# Patient Record
Sex: MALE | URBAN - METROPOLITAN AREA
[De-identification: names, ages, dates, MRNs, and addresses within clinical notes are randomized per-mention and may not be internally consistent; named-entity substitution may affect disease eponyms.]

---

## 2017-08-02 ENCOUNTER — INPATIENT (INPATIENT)
Facility: HOSPITAL | Age: 67
LOS: 4 days | Discharge: HOME | End: 2017-08-07
Attending: SURGERY | Admitting: SURGERY
Payer: SELF-PAY

## 2017-08-02 VITALS
SYSTOLIC BLOOD PRESSURE: 209 MMHG | TEMPERATURE: 99 F | HEIGHT: 67 IN | RESPIRATION RATE: 20 BRPM | DIASTOLIC BLOOD PRESSURE: 119 MMHG | WEIGHT: 190.04 LBS | HEART RATE: 87 BPM | OXYGEN SATURATION: 95 %

## 2017-08-02 DIAGNOSIS — I10 ESSENTIAL (PRIMARY) HYPERTENSION: ICD-10-CM

## 2017-08-02 DIAGNOSIS — K35.80 UNSPECIFIED ACUTE APPENDICITIS: ICD-10-CM

## 2017-08-02 DIAGNOSIS — Z29.9 ENCOUNTER FOR PROPHYLACTIC MEASURES, UNSPECIFIED: ICD-10-CM

## 2017-08-02 LAB
ALBUMIN SERPL ELPH-MCNC: 4.1 G/DL — SIGNIFICANT CHANGE UP (ref 3.3–5)
ALP SERPL-CCNC: 82 U/L — SIGNIFICANT CHANGE UP (ref 40–120)
ALT FLD-CCNC: 22 U/L — SIGNIFICANT CHANGE UP (ref 12–78)
ANION GAP SERPL CALC-SCNC: 9 MMOL/L — SIGNIFICANT CHANGE UP (ref 5–17)
APPEARANCE UR: CLEAR — SIGNIFICANT CHANGE UP
APTT BLD: 30.8 SEC — SIGNIFICANT CHANGE UP (ref 27.5–37.4)
AST SERPL-CCNC: 22 U/L — SIGNIFICANT CHANGE UP (ref 15–37)
BASOPHILS # BLD AUTO: 0.1 K/UL — SIGNIFICANT CHANGE UP (ref 0–0.2)
BASOPHILS NFR BLD AUTO: 0.9 % — SIGNIFICANT CHANGE UP (ref 0–2)
BILIRUB SERPL-MCNC: 1.5 MG/DL — HIGH (ref 0.2–1.2)
BILIRUB UR-MCNC: NEGATIVE — SIGNIFICANT CHANGE UP
BUN SERPL-MCNC: 11 MG/DL — SIGNIFICANT CHANGE UP (ref 7–23)
CALCIUM SERPL-MCNC: 8.9 MG/DL — SIGNIFICANT CHANGE UP (ref 8.5–10.1)
CHLORIDE SERPL-SCNC: 105 MMOL/L — SIGNIFICANT CHANGE UP (ref 96–108)
CO2 SERPL-SCNC: 27 MMOL/L — SIGNIFICANT CHANGE UP (ref 22–31)
COLOR SPEC: YELLOW — SIGNIFICANT CHANGE UP
CREAT SERPL-MCNC: 1.18 MG/DL — SIGNIFICANT CHANGE UP (ref 0.5–1.3)
DIFF PNL FLD: NEGATIVE — SIGNIFICANT CHANGE UP
EOSINOPHIL # BLD AUTO: 0 K/UL — SIGNIFICANT CHANGE UP (ref 0–0.5)
EOSINOPHIL NFR BLD AUTO: 0.1 % — SIGNIFICANT CHANGE UP (ref 0–6)
GLUCOSE SERPL-MCNC: 117 MG/DL — HIGH (ref 70–99)
GLUCOSE UR QL: NEGATIVE MG/DL — SIGNIFICANT CHANGE UP
HCT VFR BLD CALC: 51.2 % — HIGH (ref 39–50)
HGB BLD-MCNC: 16.2 G/DL — SIGNIFICANT CHANGE UP (ref 13–17)
INR BLD: 1.1 RATIO — SIGNIFICANT CHANGE UP (ref 0.88–1.16)
KETONES UR-MCNC: NEGATIVE — SIGNIFICANT CHANGE UP
LACTATE SERPL-SCNC: 1.2 MMOL/L — SIGNIFICANT CHANGE UP (ref 0.7–2)
LEUKOCYTE ESTERASE UR-ACNC: NEGATIVE — SIGNIFICANT CHANGE UP
LIDOCAIN IGE QN: 141 U/L — SIGNIFICANT CHANGE UP (ref 73–393)
LYMPHOCYTES # BLD AUTO: 1.1 K/UL — SIGNIFICANT CHANGE UP (ref 1–3.3)
LYMPHOCYTES # BLD AUTO: 10.9 % — LOW (ref 13–44)
MCHC RBC-ENTMCNC: 29.6 PG — SIGNIFICANT CHANGE UP (ref 27–34)
MCHC RBC-ENTMCNC: 31.6 GM/DL — LOW (ref 32–36)
MCV RBC AUTO: 93.8 FL — SIGNIFICANT CHANGE UP (ref 80–100)
MONOCYTES # BLD AUTO: 0.5 K/UL — SIGNIFICANT CHANGE UP (ref 0–0.9)
MONOCYTES NFR BLD AUTO: 4.5 % — SIGNIFICANT CHANGE UP (ref 2–14)
NEUTROPHILS # BLD AUTO: 8.5 K/UL — HIGH (ref 1.8–7.4)
NEUTROPHILS NFR BLD AUTO: 83.5 % — HIGH (ref 43–77)
NITRITE UR-MCNC: NEGATIVE — SIGNIFICANT CHANGE UP
PH UR: 6.5 — SIGNIFICANT CHANGE UP (ref 5–8)
PLATELET # BLD AUTO: 128 K/UL — LOW (ref 150–400)
POTASSIUM SERPL-MCNC: 3.8 MMOL/L — SIGNIFICANT CHANGE UP (ref 3.5–5.3)
POTASSIUM SERPL-SCNC: 3.8 MMOL/L — SIGNIFICANT CHANGE UP (ref 3.5–5.3)
PROT SERPL-MCNC: 7.7 GM/DL — SIGNIFICANT CHANGE UP (ref 6–8.3)
PROT UR-MCNC: NEGATIVE MG/DL — SIGNIFICANT CHANGE UP
PROTHROM AB SERPL-ACNC: 12 SEC — SIGNIFICANT CHANGE UP (ref 9.8–12.7)
RBC # BLD: 5.46 M/UL — SIGNIFICANT CHANGE UP (ref 4.2–5.8)
RBC # FLD: 11.6 % — SIGNIFICANT CHANGE UP (ref 11–15)
SODIUM SERPL-SCNC: 141 MMOL/L — SIGNIFICANT CHANGE UP (ref 135–145)
SP GR SPEC: 1.01 — SIGNIFICANT CHANGE UP (ref 1.01–1.02)
UROBILINOGEN FLD QL: 1 MG/DL
WBC # BLD: 10.2 K/UL — SIGNIFICANT CHANGE UP (ref 3.8–10.5)
WBC # FLD AUTO: 10.2 K/UL — SIGNIFICANT CHANGE UP (ref 3.8–10.5)

## 2017-08-02 PROCEDURE — 71010: CPT | Mod: 26

## 2017-08-02 PROCEDURE — 99284 EMERGENCY DEPT VISIT MOD MDM: CPT

## 2017-08-02 PROCEDURE — 74177 CT ABD & PELVIS W/CONTRAST: CPT | Mod: 26

## 2017-08-02 RX ORDER — SODIUM CHLORIDE 9 MG/ML
3 INJECTION INTRAMUSCULAR; INTRAVENOUS; SUBCUTANEOUS ONCE
Qty: 0 | Refills: 0 | Status: COMPLETED | OUTPATIENT
Start: 2017-08-02 | End: 2017-08-02

## 2017-08-02 RX ORDER — HEPARIN SODIUM 5000 [USP'U]/ML
5000 INJECTION INTRAVENOUS; SUBCUTANEOUS EVERY 12 HOURS
Qty: 0 | Refills: 0 | Status: DISCONTINUED | OUTPATIENT
Start: 2017-08-02 | End: 2017-08-03

## 2017-08-02 RX ORDER — MORPHINE SULFATE 50 MG/1
2 CAPSULE, EXTENDED RELEASE ORAL EVERY 4 HOURS
Qty: 0 | Refills: 0 | Status: DISCONTINUED | OUTPATIENT
Start: 2017-08-02 | End: 2017-08-03

## 2017-08-02 RX ORDER — CEFOTETAN DISODIUM 1 G
2 VIAL (EA) INJECTION ONCE
Qty: 0 | Refills: 0 | Status: COMPLETED | OUTPATIENT
Start: 2017-08-02 | End: 2017-08-02

## 2017-08-02 RX ORDER — ONDANSETRON 8 MG/1
4 TABLET, FILM COATED ORAL ONCE
Qty: 0 | Refills: 0 | Status: DISCONTINUED | OUTPATIENT
Start: 2017-08-02 | End: 2017-08-03

## 2017-08-02 RX ORDER — ONDANSETRON 8 MG/1
4 TABLET, FILM COATED ORAL EVERY 6 HOURS
Qty: 0 | Refills: 0 | Status: DISCONTINUED | OUTPATIENT
Start: 2017-08-02 | End: 2017-08-03

## 2017-08-02 RX ORDER — IOHEXOL 300 MG/ML
30 INJECTION, SOLUTION INTRAVENOUS ONCE
Qty: 0 | Refills: 0 | Status: COMPLETED | OUTPATIENT
Start: 2017-08-02 | End: 2017-08-02

## 2017-08-02 RX ORDER — CEFOTETAN DISODIUM 1 G
2 VIAL (EA) INJECTION EVERY 12 HOURS
Qty: 0 | Refills: 0 | Status: DISCONTINUED | OUTPATIENT
Start: 2017-08-03 | End: 2017-08-03

## 2017-08-02 RX ORDER — MORPHINE SULFATE 50 MG/1
4 CAPSULE, EXTENDED RELEASE ORAL ONCE
Qty: 0 | Refills: 0 | Status: DISCONTINUED | OUTPATIENT
Start: 2017-08-02 | End: 2017-08-02

## 2017-08-02 RX ORDER — SODIUM CHLORIDE 9 MG/ML
1000 INJECTION, SOLUTION INTRAVENOUS
Qty: 0 | Refills: 0 | Status: DISCONTINUED | OUTPATIENT
Start: 2017-08-02 | End: 2017-08-03

## 2017-08-02 RX ORDER — ACETAMINOPHEN 500 MG
650 TABLET ORAL EVERY 6 HOURS
Qty: 0 | Refills: 0 | Status: DISCONTINUED | OUTPATIENT
Start: 2017-08-02 | End: 2017-08-03

## 2017-08-02 RX ORDER — METRONIDAZOLE 500 MG
500 TABLET ORAL ONCE
Qty: 0 | Refills: 0 | Status: COMPLETED | OUTPATIENT
Start: 2017-08-02 | End: 2017-08-02

## 2017-08-02 RX ORDER — METOPROLOL TARTRATE 50 MG
5 TABLET ORAL EVERY 6 HOURS
Qty: 0 | Refills: 0 | Status: DISCONTINUED | OUTPATIENT
Start: 2017-08-02 | End: 2017-08-03

## 2017-08-02 RX ORDER — MORPHINE SULFATE 50 MG/1
2 CAPSULE, EXTENDED RELEASE ORAL ONCE
Qty: 0 | Refills: 0 | Status: DISCONTINUED | OUTPATIENT
Start: 2017-08-02 | End: 2017-08-02

## 2017-08-02 RX ORDER — SODIUM CHLORIDE 9 MG/ML
1000 INJECTION INTRAMUSCULAR; INTRAVENOUS; SUBCUTANEOUS ONCE
Qty: 0 | Refills: 0 | Status: COMPLETED | OUTPATIENT
Start: 2017-08-02 | End: 2017-08-02

## 2017-08-02 RX ADMIN — SODIUM CHLORIDE 1000 MILLILITER(S): 9 INJECTION INTRAMUSCULAR; INTRAVENOUS; SUBCUTANEOUS at 19:46

## 2017-08-02 RX ADMIN — Medication 100 GRAM(S): at 23:07

## 2017-08-02 RX ADMIN — MORPHINE SULFATE 2 MILLIGRAM(S): 50 CAPSULE, EXTENDED RELEASE ORAL at 21:47

## 2017-08-02 RX ADMIN — MORPHINE SULFATE 2 MILLIGRAM(S): 50 CAPSULE, EXTENDED RELEASE ORAL at 22:17

## 2017-08-02 RX ADMIN — Medication 650 MILLIGRAM(S): at 23:49

## 2017-08-02 RX ADMIN — MORPHINE SULFATE 4 MILLIGRAM(S): 50 CAPSULE, EXTENDED RELEASE ORAL at 21:21

## 2017-08-02 RX ADMIN — Medication 100 MILLIGRAM(S): at 23:07

## 2017-08-02 RX ADMIN — Medication 5 MILLIGRAM(S): at 23:49

## 2017-08-02 RX ADMIN — IOHEXOL 30 MILLILITER(S): 300 INJECTION, SOLUTION INTRAVENOUS at 19:46

## 2017-08-02 RX ADMIN — SODIUM CHLORIDE 3 MILLILITER(S): 9 INJECTION INTRAMUSCULAR; INTRAVENOUS; SUBCUTANEOUS at 19:47

## 2017-08-02 RX ADMIN — MORPHINE SULFATE 4 MILLIGRAM(S): 50 CAPSULE, EXTENDED RELEASE ORAL at 19:45

## 2017-08-02 NOTE — H&P ADULT - NSHPSOCIALHISTORY_GEN_ALL_CORE
Patient admits to a history of smoking, but he quit approximately 5 years ago. Patient admits to drinking 2 beers per day but has not had anything to drink today.

## 2017-08-02 NOTE — H&P ADULT - NSHPLABSRESULTS_GEN_ALL_CORE
LABS:                        16.2   10.2  )-----------( 128      ( 02 Aug 2017 19:46 )             51.2     08    141  |  105  |  11  ----------------------------<  117<H>  3.8   |  27  |  1.18    Ca    8.9      02 Aug 2017 19:46    TPro  7.7  /  Alb  4.1  /  TBili  1.5<H>  /  DBili  x   /  AST  22  /  ALT  22  /  AlkPhos  82  08    PT/INR - ( 02 Aug 2017 19:46 )   PT: 12.0 sec;   INR: 1.10 ratio      PTT - ( 02 Aug 2017 19:46 )  PTT:30.8 sec  Urinalysis Basic - ( 02 Aug 2017 19:46 )    Color: Yellow / Appearance: Clear / S.010 / pH: x  Gluc: x / Ketone: Negative  / Bili: Negative / Urobili: 1 mg/dL   Blood: x / Protein: Negative mg/dL / Nitrite: Negative   Leuk Esterase: Negative / RBC: x / WBC x   Sq Epi: x / Non Sq Epi: x / Bacteria: x    RADIOLOGY & ADDITIONAL STUDIES:  from: CT Abdomen and Pelvis w/ Oral Cont and w/ IV Cont (17 @ 21:35): Uncomplicated acute appendicitis.

## 2017-08-02 NOTE — H&P ADULT - NSHPPHYSICALEXAM_GEN_ALL_CORE
Vital Signs Last 24 Hrs  T(C): 38.1 (02 Aug 2017 23:36), Max: 38.2 (02 Aug 2017 19:11)  T(F): 100.6 (02 Aug 2017 23:36), Max: 100.7 (02 Aug 2017 19:11)  HR: 80 (02 Aug 2017 23:36) (80 - 89)  BP: 169/95 (02 Aug 2017 23:36) (169/95 - 209/119)  RR: 22 (02 Aug 2017 23:36) (20 - 22)  SpO2: 97% (02 Aug 2017 23:36) (95% - 97%)    Physical Exam:    General:  Appears stated age, well-groomed, well-nourished, mild distress due to pain   Eyes : PERRL, EOMI  HENT:  WNL, no JVD  Chest:  CTA B/L  Cardiovascular:  Regular rate & rhythm, S1S2  Abdomen:  +BS, Soft, ++tender to palpation in RLQ, Mild distention. No gaurding, no rebound tenderness  Extremities:  No calf tenderness, no edema  Skin:  No rash  Neuro/Psych:  Alert, oriented to time, place and person

## 2017-08-02 NOTE — H&P ADULT - NSHPREVIEWOFSYSTEMS_GEN_ALL_CORE
I have reviewed 9 systems and the only positive findings are 101/10 RLQ abdominal pain which is radiating up his right side. +Nausea.

## 2017-08-02 NOTE — ED PROVIDER NOTE - PHYSICAL EXAMINATION
Gen: Alert, in moderate distress  Head: NC, AT, EOMI, normal lids/conjunctiva  ENT: patent oropharynx without erythema/exudate, uvula midline  Neck: +supple, no tenderness/meningismus/JVD, +Trachea midline  Pulm: Bilateral BS, normal resp effort, no wheeze/stridor/retractions  CV: RRR, no M/R/G, +dist pulses  Abd: soft, distended, +TTP greatest in RLQ, +guarding/+rebound, +rovsings, +mcburneys  Mskel: no edema/erythema/cyanosis  Skin: no rash  Neuro: AAOx3, no sensory/motor deficits, CN 2-12 intact Gen: Alert, in moderate distress  Head: NC, AT, EOMI, normal lids/conjunctiva  ENT: patent oropharynx without erythema/exudate, uvula midline  Neck: +supple, no tenderness/meningismus/JVD, +Trachea midline  Pulm: Bilateral BS, normal resp effort, no wheeze/stridor/retractions  CV: RRR, no M/R/G, +dist pulses  Abd: soft, distended, +TTP greatest in RLQ, +guarding/+rebound, +rovsings, +mcburneys  Mskel: no edema/erythema/cyanosis  Skin: no rash  Neuro: AAOx3, no gross sensory/motor deficits noted

## 2017-08-02 NOTE — ED PROVIDER NOTE - OBJECTIVE STATEMENT
Pertinent PMH/PSH/FHx/SHx and Review of Systems contained within:  H&P obtained with assistance of pt's family member as  since pt is mostly creole speaking. 67 yo male w/ pmh of nephrolithiasis in the past (last occurrence 8yrs ago), presents to the ED for abdominal pain X 1day. Reports constant pain, gradual onset, worsening throughout day, mostly RLQ, 10/10 intensity, exacerbated by movement, not relieved by anything in particular, associated w/ nausea. Denies fever, chills, cough, cp, sob, dysuria/hematuria, constipation/diarrhea, rash, trauma, past surgery or bleeding disorders. Denies med allergies. Has not taken any meds for pain. Last BM was approx 7pm today. Last PO food and drink was "sometime this afternoon". Pertinent PMH/PSH/FHx/SHx and Review of Systems contained within:  H&P obtained with assistance of pt's family member as  since pt is mostly creole speaking. 65 yo male w/ pmh of nephrolithiasis in the past (last occurrence 8yrs ago), presents to the ED for abdominal pain X 1day. Reports constant pain, gradual onset, worsening throughout day, mostly RLQ, 10/10 intensity, exacerbated by movement, not relieved by anything in particular, associated w/ nausea. Denies fever, chills, cough, cp, sob, dysuria/hematuria, constipation/diarrhea, dark or bloody stools, rash, trauma, past surgery or bleeding disorders. Denies med allergies. Has not taken any meds for pain. Last BM was approx 7am today. Last PO food and drink was "sometime this afternoon". Pertinent PMH/PSH/FHx/SHx and Review of Systems contained within:  H&P obtained with assistance of pt's family member as  since pt is mostly creole speaking. 65 yo male w/ pmh of nephrolithiasis in the past (last occurrence 8yrs ago), presents to the ED for abdominal pain X 1day. Reports constant pain, gradual onset, worsening throughout day, mostly RLQ, 10/10 intensity, exacerbated by movement, not relieved by anything in particular, associated w/ nausea. Denies fever, chills, cough, cp, sob, dysuria/hematuria, constipation/diarrhea, dark or bloody stools, rash, trauma, past surgery or bleeding disorders. Denies med allergies. Has not taken any meds for pain. Last BM was approx 7am today. Last PO food was approx 8am (eggs, bread and coffee).

## 2017-08-02 NOTE — H&P ADULT - ATTENDING COMMENTS
66 y./o. Irish male with brief history of abdominal pain associated with nausea and vomiting and RLQ tenderness. CT reveals uncomplicated appendicitis. For laparoscopic - possible open - appendicitis. R/B/A explained including possibility of open conversion and the circumstances under which that might occur. Son functioned as  throughout interview and consent process.

## 2017-08-02 NOTE — ED PROVIDER NOTE - MEDICAL DECISION MAKING DETAILS
67yo male w/ abd pain. H&P most consistent w/ acute appendicitis. Diff dx includes nephrolithiasis (though less likely - no hematuria, no back pain, no cva ttp) vs UTI vs gastritis.  IV fluids, pain mngmt, labs, CT abd/pelvis and will likely need gen surg consult and admission. Pt and his family understand and agree w/ plan.

## 2017-08-02 NOTE — H&P ADULT - HISTORY OF PRESENT ILLNESS
65 yo male w/ pmh of nephrolithiasis and HTN, not currently on medication presents to the ED for abdominal pain X 1day. Reports constant pain, gradual onset, worsening throughout day, mostly RLQ, 10/10 intensity, exacerbated by movement, not relieved by anything in particular, associated w/ nausea. Denies fever, chills, cough, cp, sob, dysuria/hematuria, constipation/diarrhea, dark or bloody stools, rash, trauma, past surgery or bleeding disorders. Denies med allergies. Last BM was approx 7am today. Last PO food was approx 8am (eggs, bread and coffee).    CT abdomen and pelvis shows acute uncomplicated appendicitis.

## 2017-08-02 NOTE — ED PROVIDER NOTE - PROGRESS NOTE DETAILS
Reassessed pt, his pain is now down to 6/10, repeat BP is 180s/90. Pt's son reports that pt has been diagnosed w/ htn in past and started "on a beta blocker" but pt's pmd in [facility] did not continue same. Instructed pt of need to f/u with PMD outpt to determine if he should be taking antihypertensive med.

## 2017-08-02 NOTE — H&P ADULT - PROBLEM SELECTOR PLAN 1
admit, OR in the morning for Laparoscopic Appendectomy, pain control, IV antibiotics, NPO, Zofran PRN

## 2017-08-02 NOTE — H&P ADULT - PROBLEM SELECTOR PLAN 2
Metoprolol 5mg IVP q6 started for improved control of BP, will switch to po after OR. Monitor BP and symptoms closely

## 2017-08-02 NOTE — ED ADULT TRIAGE NOTE - CHIEF COMPLAINT QUOTE
patient c/o of abdominal pain started this morning denied N/V denied diarrhea , denied chest pain ,c/o of mild headache

## 2017-08-03 ENCOUNTER — TRANSCRIPTION ENCOUNTER (OUTPATIENT)
Age: 67
End: 2017-08-03

## 2017-08-03 LAB
ANION GAP SERPL CALC-SCNC: 10 MMOL/L — SIGNIFICANT CHANGE UP (ref 5–17)
BUN SERPL-MCNC: 10 MG/DL — SIGNIFICANT CHANGE UP (ref 7–23)
CALCIUM SERPL-MCNC: 8.3 MG/DL — LOW (ref 8.5–10.1)
CHLORIDE SERPL-SCNC: 105 MMOL/L — SIGNIFICANT CHANGE UP (ref 96–108)
CO2 SERPL-SCNC: 25 MMOL/L — SIGNIFICANT CHANGE UP (ref 22–31)
CREAT SERPL-MCNC: 1.23 MG/DL — SIGNIFICANT CHANGE UP (ref 0.5–1.3)
CULTURE RESULTS: NO GROWTH — SIGNIFICANT CHANGE UP
GLUCOSE SERPL-MCNC: 130 MG/DL — HIGH (ref 70–99)
HCT VFR BLD CALC: 44.7 % — SIGNIFICANT CHANGE UP (ref 39–50)
HGB BLD-MCNC: 14.9 G/DL — SIGNIFICANT CHANGE UP (ref 13–17)
MAGNESIUM SERPL-MCNC: 2.1 MG/DL — SIGNIFICANT CHANGE UP (ref 1.6–2.6)
MCHC RBC-ENTMCNC: 30.6 PG — SIGNIFICANT CHANGE UP (ref 27–34)
MCHC RBC-ENTMCNC: 33.3 GM/DL — SIGNIFICANT CHANGE UP (ref 32–36)
MCV RBC AUTO: 91.8 FL — SIGNIFICANT CHANGE UP (ref 80–100)
PHOSPHATE SERPL-MCNC: 2.9 MG/DL — SIGNIFICANT CHANGE UP (ref 2.5–4.5)
PLATELET # BLD AUTO: 128 K/UL — LOW (ref 150–400)
POTASSIUM SERPL-MCNC: 3.7 MMOL/L — SIGNIFICANT CHANGE UP (ref 3.5–5.3)
POTASSIUM SERPL-SCNC: 3.7 MMOL/L — SIGNIFICANT CHANGE UP (ref 3.5–5.3)
RBC # BLD: 4.87 M/UL — SIGNIFICANT CHANGE UP (ref 4.2–5.8)
RBC # FLD: 11.5 % — SIGNIFICANT CHANGE UP (ref 11–15)
SODIUM SERPL-SCNC: 140 MMOL/L — SIGNIFICANT CHANGE UP (ref 135–145)
SPECIMEN SOURCE: SIGNIFICANT CHANGE UP
WBC # BLD: 11.2 K/UL — HIGH (ref 3.8–10.5)
WBC # FLD AUTO: 11.2 K/UL — HIGH (ref 3.8–10.5)

## 2017-08-03 PROCEDURE — 88304 TISSUE EXAM BY PATHOLOGIST: CPT | Mod: 26

## 2017-08-03 PROCEDURE — 44970 LAPAROSCOPY APPENDECTOMY: CPT | Mod: AS

## 2017-08-03 RX ORDER — CEFOTETAN DISODIUM 1 G
2 VIAL (EA) INJECTION EVERY 12 HOURS
Qty: 0 | Refills: 0 | Status: COMPLETED | OUTPATIENT
Start: 2017-08-03 | End: 2017-08-03

## 2017-08-03 RX ORDER — ONDANSETRON 8 MG/1
4 TABLET, FILM COATED ORAL EVERY 6 HOURS
Qty: 0 | Refills: 0 | Status: DISCONTINUED | OUTPATIENT
Start: 2017-08-03 | End: 2017-08-07

## 2017-08-03 RX ORDER — HYDRALAZINE HCL 50 MG
10 TABLET ORAL ONCE
Qty: 0 | Refills: 0 | Status: COMPLETED | OUTPATIENT
Start: 2017-08-03 | End: 2017-08-03

## 2017-08-03 RX ORDER — ACETAMINOPHEN 500 MG
650 TABLET ORAL EVERY 6 HOURS
Qty: 0 | Refills: 0 | Status: DISCONTINUED | OUTPATIENT
Start: 2017-08-03 | End: 2017-08-07

## 2017-08-03 RX ORDER — ACETAMINOPHEN 500 MG
1000 TABLET ORAL ONCE
Qty: 0 | Refills: 0 | Status: COMPLETED | OUTPATIENT
Start: 2017-08-03 | End: 2017-08-03

## 2017-08-03 RX ORDER — FENTANYL CITRATE 50 UG/ML
25 INJECTION INTRAVENOUS
Qty: 0 | Refills: 0 | Status: DISCONTINUED | OUTPATIENT
Start: 2017-08-03 | End: 2017-08-03

## 2017-08-03 RX ORDER — OXYCODONE AND ACETAMINOPHEN 5; 325 MG/1; MG/1
1 TABLET ORAL EVERY 4 HOURS
Qty: 0 | Refills: 0 | Status: DISCONTINUED | OUTPATIENT
Start: 2017-08-03 | End: 2017-08-07

## 2017-08-03 RX ORDER — OXYCODONE AND ACETAMINOPHEN 5; 325 MG/1; MG/1
2 TABLET ORAL EVERY 4 HOURS
Qty: 0 | Refills: 0 | Status: DISCONTINUED | OUTPATIENT
Start: 2017-08-03 | End: 2017-08-07

## 2017-08-03 RX ORDER — SODIUM CHLORIDE 9 MG/ML
1000 INJECTION, SOLUTION INTRAVENOUS
Qty: 0 | Refills: 0 | Status: DISCONTINUED | OUTPATIENT
Start: 2017-08-03 | End: 2017-08-03

## 2017-08-03 RX ORDER — MORPHINE SULFATE 50 MG/1
2 CAPSULE, EXTENDED RELEASE ORAL EVERY 6 HOURS
Qty: 0 | Refills: 0 | Status: DISCONTINUED | OUTPATIENT
Start: 2017-08-03 | End: 2017-08-07

## 2017-08-03 RX ORDER — HEPARIN SODIUM 5000 [USP'U]/ML
5000 INJECTION INTRAVENOUS; SUBCUTANEOUS EVERY 12 HOURS
Qty: 0 | Refills: 0 | Status: DISCONTINUED | OUTPATIENT
Start: 2017-08-03 | End: 2017-08-07

## 2017-08-03 RX ORDER — CHLORHEXIDINE GLUCONATE 213 G/1000ML
1 SOLUTION TOPICAL ONCE
Qty: 0 | Refills: 0 | Status: COMPLETED | OUTPATIENT
Start: 2017-08-03 | End: 2017-08-03

## 2017-08-03 RX ORDER — TAMSULOSIN HYDROCHLORIDE 0.4 MG/1
0.4 CAPSULE ORAL AT BEDTIME
Qty: 0 | Refills: 0 | Status: DISCONTINUED | OUTPATIENT
Start: 2017-08-03 | End: 2017-08-07

## 2017-08-03 RX ADMIN — MORPHINE SULFATE 2 MILLIGRAM(S): 50 CAPSULE, EXTENDED RELEASE ORAL at 19:52

## 2017-08-03 RX ADMIN — CHLORHEXIDINE GLUCONATE 1 APPLICATION(S): 213 SOLUTION TOPICAL at 07:46

## 2017-08-03 RX ADMIN — TAMSULOSIN HYDROCHLORIDE 0.4 MILLIGRAM(S): 0.4 CAPSULE ORAL at 22:11

## 2017-08-03 RX ADMIN — Medication 1000 MILLIGRAM(S): at 13:32

## 2017-08-03 RX ADMIN — Medication 100 GRAM(S): at 10:46

## 2017-08-03 RX ADMIN — SODIUM CHLORIDE 75 MILLILITER(S): 9 INJECTION, SOLUTION INTRAVENOUS at 13:31

## 2017-08-03 RX ADMIN — Medication 100 GRAM(S): at 23:52

## 2017-08-03 RX ADMIN — MORPHINE SULFATE 2 MILLIGRAM(S): 50 CAPSULE, EXTENDED RELEASE ORAL at 01:10

## 2017-08-03 RX ADMIN — Medication 10 MILLIGRAM(S): at 03:39

## 2017-08-03 RX ADMIN — Medication 400 MILLIGRAM(S): at 13:32

## 2017-08-03 RX ADMIN — HEPARIN SODIUM 5000 UNIT(S): 5000 INJECTION INTRAVENOUS; SUBCUTANEOUS at 18:03

## 2017-08-03 RX ADMIN — MORPHINE SULFATE 2 MILLIGRAM(S): 50 CAPSULE, EXTENDED RELEASE ORAL at 20:07

## 2017-08-03 RX ADMIN — SODIUM CHLORIDE 125 MILLILITER(S): 9 INJECTION, SOLUTION INTRAVENOUS at 10:31

## 2017-08-03 RX ADMIN — SODIUM CHLORIDE 125 MILLILITER(S): 9 INJECTION, SOLUTION INTRAVENOUS at 01:21

## 2017-08-03 RX ADMIN — Medication 5 MILLIGRAM(S): at 06:01

## 2017-08-03 RX ADMIN — HEPARIN SODIUM 5000 UNIT(S): 5000 INJECTION INTRAVENOUS; SUBCUTANEOUS at 06:01

## 2017-08-03 NOTE — PROGRESS NOTE ADULT - SUBJECTIVE AND OBJECTIVE BOX
INTERVAL HPI/OVERNIGHT EVENTS:  Called to bedside by RN for patient c/o suprapubic pain and inability to urinate. Per RN, bladder scan done following void which showed >480cc in bladder. Patient seen and examined at bedside. Family at bedside--son, daughter, and wife. Patient c/o inability to urinate well and increasing pain. Patient with history of BPH and HTN, but denies taking any medication at home. Denies ever needing a Hernandez placed in the past. Otherwise, patient is tolerating diet with no complaints of nausea/vomiting or abdominal pain.  Per son- Family is from [facility] and patient's PCP is in [facility].  Denies cp, dyspnea, dizziness.    Vital Signs Last 24 Hrs  T(C): 36.8 (03 Aug 2017 18:00), Max: 38.1 (02 Aug 2017 23:36)  T(F): 98.2 (03 Aug 2017 18:00), Max: 100.6 (02 Aug 2017 23:36)  HR: 72 (03 Aug 2017 18:00) (62 - 80)  BP: 172/89 (03 Aug 2017 18:00) (154/83 - 206/97)  BP(mean): --  RR: 16 (03 Aug 2017 18:00) (13 - 22)  SpO2: 95% (03 Aug 2017 18:00) (92% - 100%)    MEDICATIONS  (STANDING):  heparin  Injectable 5000 Unit(s) SubCutaneous every 12 hours  cefoTEtan  IVPB 2 Gram(s) IV Intermittent every 12 hours    MEDICATIONS  (PRN):  acetaminophen   Tablet 650 milliGRAM(s) Oral every 6 hours PRN For Temp greater than 38 C (100.4 F)  oxyCODONE    5 mG/acetaminophen 325 mG 1 Tablet(s) Oral every 4 hours PRN Mild Pain (1 - 3)  oxyCODONE    5 mG/acetaminophen 325 mG 2 Tablet(s) Oral every 4 hours PRN Moderate Pain (4 - 6)  ondansetron Injectable 4 milliGRAM(s) IV Push every 6 hours PRN Nausea  morphine  - Injectable 2 milliGRAM(s) IV Push every 6 hours PRN Severe Pain (7 - 10)      PHYSICAL EXAM:    GENERAL: NAD  HEAD:  Atraumatic, Normocephalic  EYES: EOMI, PERRLA, conjunctiva and sclera clear  CHEST/LUNG: Clear to ausculation, bilaterally   HEART: S1S2  ABDOMEN: Steri strips in place with minimal post-op blood stain noted, otherwise clean, dry. intact. nondistended, +BS, soft, non tender, no guarding  : Adequate Meatus. No scrotal edema. + Bladder distention, +Tenderness lower abdomen/suprapubic area. Urinal canister seen with small amt. clear yellow urine   EXTREMITIES:  calf soft, non tender     I&O's Detail    02 Aug 2017 07:01  -  03 Aug 2017 07:00  --------------------------------------------------------  IN:    dextrose 5% + sodium chloride 0.45%.: 1500 mL  Total IN: 1500 mL    OUT:    Voided: 200 mL  Total OUT: 200 mL    Total NET: 1300 mL      03 Aug 2017 07:01  -  03 Aug 2017 21:02  --------------------------------------------------------  IN:    IV PiggyBack: 100 mL    lactated ringers.: 150 mL  Total IN: 250 mL    OUT:    Voided: 2880 mL  Total OUT: 2880 mL    Total NET: -2630 mL      LABS:                        14.9   11.2  )-----------( 128      ( 03 Aug 2017 06:18 )             44.7     08-03    140  |  105  |  10  ----------------------------<  130<H>  3.7   |  25  |  1.23    Ca    8.3<L>      03 Aug 2017 06:18  Phos  2.9     08-03  Mg     2.1     08-03    TPro  7.7  /  Alb  4.1  /  TBili  1.5<H>  /  DBili  x   /  AST  22  /  ALT  22  /  AlkPhos  82  08-02    PT/INR - ( 02 Aug 2017 19:46 )   PT: 12.0 sec;   INR: 1.10 ratio         PTT - ( 02 Aug 2017 19:46 )  PTT:30.8 sec  Urinalysis Basic - ( 02 Aug 2017 19:46 )    Color: Yellow / Appearance: Clear / S.010 / pH: x  Gluc: x / Ketone: Negative  / Bili: Negative / Urobili: 1 mg/dL   Blood: x / Protein: Negative mg/dL / Nitrite: Negative   Leuk Esterase: Negative / RBC: x / WBC x   Sq Epi: x / Non Sq Epi: x / Bacteria: x      Impression: 66y Male with pmhx HTN, BPH a/w acute appendicitis s/p Lap Appy. With UR.    Plan:  -Patient in UR, with hx of BPH-- 14F coude catheter placed, Flomax.  -Advance diet as tolerated  -pain management PRN  -DVT prophylaxis  -OOB, Ambulating, encourage incentive spirometer  -continue local wound care  -Continue medical management/supportive care  -f/u AM labs  -Will discuss with attending.

## 2017-08-03 NOTE — PROGRESS NOTE ADULT - SUBJECTIVE AND OBJECTIVE BOX
Diagnosis:  Acute Appendicitis     Procedure:  Laparoscopic Appendectomy, possible open                             14.9   11.2  )-----------( 128      ( 03 Aug 2017 06:18 )             44.7         03 Aug 2017 06:18    140    |  105    |  10     ----------------------------<  130    3.7     |  25     |  1.23     Ca    8.3        03 Aug 2017 06:18  Phos  2.9       03 Aug 2017 06:18  Mg     2.1       03 Aug 2017 06:18    TPro  7.7    /  Alb  4.1    /  TBili  1.5    /  DBili  x      /  AST  22     /  ALT  22     /  AlkPhos  82     02 Aug 2017 19:46        PT/INR - ( 02 Aug 2017 19:46 )   PT: 12.0 sec;   INR: 1.10 ratio         PTT - ( 02 Aug 2017 19:46 )  PTT:30.8 sec    Urinalysis Basic - ( 02 Aug 2017 19:46 )    Color: Yellow / Appearance: Clear / S.010 / pH: x  Gluc: x / Ketone: Negative  / Bili: Negative / Urobili: 1 mg/dL   Blood: x / Protein: Negative mg/dL / Nitrite: Negative   Leuk Esterase: Negative / RBC: x / WBC x   Sq Epi: x / Non Sq Epi: x / Bacteria: x        LIVER FUNCTIONS - ( 02 Aug 2017 19:46 )  Alb: 4.1 g/dL / Pro: 7.7 gm/dL / ALK PHOS: 82 U/L / ALT: 22 U/L / AST: 22 U/L / GGT: x               npo   ivf   pain management   antibiotic   Consent signed and in chart -- Discuss risk and benefit

## 2017-08-04 LAB
ANION GAP SERPL CALC-SCNC: 10 MMOL/L — SIGNIFICANT CHANGE UP (ref 5–17)
BASOPHILS # BLD AUTO: 0.1 K/UL — SIGNIFICANT CHANGE UP (ref 0–0.2)
BASOPHILS NFR BLD AUTO: 1.1 % — SIGNIFICANT CHANGE UP (ref 0–2)
BUN SERPL-MCNC: 6 MG/DL — LOW (ref 7–23)
CALCIUM SERPL-MCNC: 8.5 MG/DL — SIGNIFICANT CHANGE UP (ref 8.5–10.1)
CHLORIDE SERPL-SCNC: 108 MMOL/L — SIGNIFICANT CHANGE UP (ref 96–108)
CO2 SERPL-SCNC: 24 MMOL/L — SIGNIFICANT CHANGE UP (ref 22–31)
CREAT SERPL-MCNC: 1.23 MG/DL — SIGNIFICANT CHANGE UP (ref 0.5–1.3)
EOSINOPHIL # BLD AUTO: 0 K/UL — SIGNIFICANT CHANGE UP (ref 0–0.5)
EOSINOPHIL NFR BLD AUTO: 0.7 % — SIGNIFICANT CHANGE UP (ref 0–6)
GLUCOSE SERPL-MCNC: 96 MG/DL — SIGNIFICANT CHANGE UP (ref 70–99)
HCT VFR BLD CALC: 47.6 % — SIGNIFICANT CHANGE UP (ref 39–50)
HGB BLD-MCNC: 15.2 G/DL — SIGNIFICANT CHANGE UP (ref 13–17)
LYMPHOCYTES # BLD AUTO: 1.3 K/UL — SIGNIFICANT CHANGE UP (ref 1–3.3)
LYMPHOCYTES # BLD AUTO: 21 % — SIGNIFICANT CHANGE UP (ref 13–44)
MAGNESIUM SERPL-MCNC: 2 MG/DL — SIGNIFICANT CHANGE UP (ref 1.6–2.6)
MCHC RBC-ENTMCNC: 30.1 PG — SIGNIFICANT CHANGE UP (ref 27–34)
MCHC RBC-ENTMCNC: 32 GM/DL — SIGNIFICANT CHANGE UP (ref 32–36)
MCV RBC AUTO: 94.1 FL — SIGNIFICANT CHANGE UP (ref 80–100)
MONOCYTES # BLD AUTO: 0.4 K/UL — SIGNIFICANT CHANGE UP (ref 0–0.9)
MONOCYTES NFR BLD AUTO: 6.8 % — SIGNIFICANT CHANGE UP (ref 2–14)
NEUTROPHILS # BLD AUTO: 4.5 K/UL — SIGNIFICANT CHANGE UP (ref 1.8–7.4)
NEUTROPHILS NFR BLD AUTO: 70.4 % — SIGNIFICANT CHANGE UP (ref 43–77)
PHOSPHATE SERPL-MCNC: 3 MG/DL — SIGNIFICANT CHANGE UP (ref 2.5–4.5)
PLATELET # BLD AUTO: 117 K/UL — LOW (ref 150–400)
POTASSIUM SERPL-MCNC: 3.6 MMOL/L — SIGNIFICANT CHANGE UP (ref 3.5–5.3)
POTASSIUM SERPL-SCNC: 3.6 MMOL/L — SIGNIFICANT CHANGE UP (ref 3.5–5.3)
RBC # BLD: 5.06 M/UL — SIGNIFICANT CHANGE UP (ref 4.2–5.8)
RBC # FLD: 11.8 % — SIGNIFICANT CHANGE UP (ref 11–15)
SODIUM SERPL-SCNC: 142 MMOL/L — SIGNIFICANT CHANGE UP (ref 135–145)
SURGICAL PATHOLOGY FINAL REPORT - CH: SIGNIFICANT CHANGE UP
WBC # BLD: 6.3 K/UL — SIGNIFICANT CHANGE UP (ref 3.8–10.5)
WBC # FLD AUTO: 6.3 K/UL — SIGNIFICANT CHANGE UP (ref 3.8–10.5)

## 2017-08-04 RX ORDER — TAMSULOSIN HYDROCHLORIDE 0.4 MG/1
0.4 CAPSULE ORAL ONCE
Qty: 0 | Refills: 0 | Status: COMPLETED | OUTPATIENT
Start: 2017-08-04 | End: 2017-08-04

## 2017-08-04 RX ORDER — METOPROLOL TARTRATE 50 MG
25 TABLET ORAL
Qty: 0 | Refills: 0 | Status: DISCONTINUED | OUTPATIENT
Start: 2017-08-04 | End: 2017-08-07

## 2017-08-04 RX ADMIN — Medication 25 MILLIGRAM(S): at 17:23

## 2017-08-04 RX ADMIN — HEPARIN SODIUM 5000 UNIT(S): 5000 INJECTION INTRAVENOUS; SUBCUTANEOUS at 05:33

## 2017-08-04 RX ADMIN — OXYCODONE AND ACETAMINOPHEN 2 TABLET(S): 5; 325 TABLET ORAL at 00:29

## 2017-08-04 RX ADMIN — TAMSULOSIN HYDROCHLORIDE 0.4 MILLIGRAM(S): 0.4 CAPSULE ORAL at 12:49

## 2017-08-04 RX ADMIN — OXYCODONE AND ACETAMINOPHEN 2 TABLET(S): 5; 325 TABLET ORAL at 01:29

## 2017-08-04 RX ADMIN — OXYCODONE AND ACETAMINOPHEN 2 TABLET(S): 5; 325 TABLET ORAL at 16:15

## 2017-08-04 RX ADMIN — TAMSULOSIN HYDROCHLORIDE 0.4 MILLIGRAM(S): 0.4 CAPSULE ORAL at 22:02

## 2017-08-04 RX ADMIN — HEPARIN SODIUM 5000 UNIT(S): 5000 INJECTION INTRAVENOUS; SUBCUTANEOUS at 17:23

## 2017-08-04 RX ADMIN — OXYCODONE AND ACETAMINOPHEN 2 TABLET(S): 5; 325 TABLET ORAL at 15:19

## 2017-08-04 NOTE — DISCHARGE NOTE ADULT - ADDITIONAL INSTRUCTIONS
Please notify physician sooner or return to the ER with worsening or recurrence of symptoms, if any chest pain, shortness of breath, palpitations, abdominal pain, nausea/vomiting, fever>101, unable to tolerate food/drink, inability to void or other concerns/problems.

## 2017-08-04 NOTE — DISCHARGE NOTE ADULT - NS AS ACTIVITY OBS
Do not make important decisions/Do not drive or operate machinery/Walking-Indoors allowed/Walking-Outdoors allowed/Stairs allowed/No Heavy lifting/straining/Showering allowed

## 2017-08-04 NOTE — DISCHARGE NOTE ADULT - MEDICATION SUMMARY - MEDICATIONS TO TAKE
I will START or STAY ON the medications listed below when I get home from the hospital:    acetaminophen-oxycodone 325 mg-5 mg oral tablet  -- 1 tab(s) by mouth every 6 hours, As Needed -for moderate pain MDD:4  -- Caution federal law prohibits the transfer of this drug to any person other  than the person for whom it was prescribed.  May cause drowsiness.  Alcohol may intensify this effect.  Use care when operating dangerous machinery.  This prescription cannot be refilled.  This product contains acetaminophen.  Do not use  with any other product containing acetaminophen to prevent possible liver damage.  Using more of this medication than prescribed may cause serious breathing problems.    -- Indication: For Pain    lisinopril 20 mg oral tablet  -- 1 tab(s) by mouth once a day  -- Do not take this drug if you are pregnant.  It is very important that you take or use this exactly as directed.  Do not skip doses or discontinue unless directed by your doctor.  Some non-prescription drugs may aggravate your condition.  Read all labels carefully.  If a warning appears, check with your doctor before taking.    -- Indication: For Hypertension    tamsulosin 0.4 mg oral capsule  -- 1 cap(s) by mouth once a day  -- It is very important that you take or use this exactly as directed.  Do not skip doses or discontinue unless directed by your doctor.  May cause drowsiness.  Alcohol may intensify this effect.  Use care when operating dangerous machinery.  Some non-prescription drugs may aggravate your condition.  Read all labels carefully.  If a warning appears, check with your doctor before taking.  Swallow whole.  Do not crush.  Take with food or milk.    -- Indication: For BPH (benign prostatic hyperplasia)    metoprolol 25 mg oral tablet  -- 1 tab(s) by mouth 2 times a day  -- It is very important that you take or use this exactly as directed.  Do not skip doses or discontinue unless directed by your doctor.  May cause drowsiness.  Alcohol may intensify this effect.  Use care when operating dangerous machinery.  Some non-prescription drugs may aggravate your condition.  Read all labels carefully.  If a warning appears, check with your doctor before taking.  Take with food or milk.  This drug may impair the ability to drive or operate machinery.  Use care until you become familiar with its effects.    -- Indication: For Hypertension    hydroCHLOROthiazide 25 mg oral tablet  -- 1 tab(s) by mouth once a day  -- Avoid prolonged or excessive exposure to direct and/or artificial sunlight while taking this medication.  It is very important that you take or use this exactly as directed.  Do not skip doses or discontinue unless directed by your doctor.  It may be advisable to drink a full glass orange juice or eat a banana daily while taking this medication.  Take with food or milk.    -- Indication: For Hypertension

## 2017-08-04 NOTE — DISCHARGE NOTE ADULT - HOSPITAL COURSE
65 yo male w/ pmh of nephrolithiasis, BPH and HTN, not currently on medications presents to the ED for abdominal pain X 1day. Diagnostic work up in the ED included a CT scan A/P which revealed an uncomplicated acute appendicitis. Patient was admitted on 8/2/17 for acute appendicitis and uncontrolled HTN. Patient was pre-opped for surgery, placed NPO, IV hydration, ABX, analgesia PRN, and BP was closely monitored and controlled with use of metoprolol.  Patient was taken to the OR the next morning 8/3/17 for laparoscopic appendectomy. Intraoperatively, appendix was removed without complications, EBL 10cc, and patient did well. Postoperatively, patient was seen in urinary retention with suprapubic pain and distended bladder with a bladder scan of >480cc of urine, a 14F coude catheter was placed which provided relief to patient. Patient was started on flomax. Otherwise, patient's post-op abdominal pain was well controlled, and patient was tolerating diet with no complaints of nasuea or vomiting. On the morning of 8/4/17-- 67 yo male w/ pmh of nephrolithiasis, BPH and HTN, not currently on medications presents to the ED for abdominal pain X 1day. Diagnostic work up in the ED included a CT scan A/P which revealed an uncomplicated acute appendicitis. Patient was admitted on 8/2/17 for acute appendicitis and uncontrolled HTN. Patient was pre-opped for surgery, placed NPO, IV hydration, ABX, analgesia PRN, and BP was closely monitored and controlled with use of metoprolol.  Patient was taken to the OR the next morning 8/3/17 for laparoscopic appendectomy. Intraoperatively, appendix was removed without complications, EBL 10cc, and patient did well. Postoperatively, patient was seen in urinary retention with suprapubic pain and distended bladder with a bladder scan of >480cc of urine, a 14F coude catheter was placed which provided relief to patient. Patient was started on flomax. Otherwise, patient's post-op abdominal pain was well controlled, and patient was tolerating diet with no complaints of nasuea or vomiting. Patient voided on next trial of void. With 3 HTN meds, BP improved.  Patient did well with the rest of the course.  Postop pain is minimal.  Tolerated advancing diet and bowel function returned.  Patient was dc with improved BP and cleared by medicine.

## 2017-08-04 NOTE — DISCHARGE NOTE ADULT - PATIENT PORTAL LINK FT
“You can access the [facility] Patient Portal, offered by [facility], by registering with the following website: [email or web address]”

## 2017-08-04 NOTE — DISCHARGE NOTE ADULT - CARE PLAN
Principal Discharge DX:	Acute appendicitis, unspecified acute appendicitis type  Goal:	S/p Laparoscopic Appendectomy, recover from surgery  Instructions for follow-up, activity and diet:	May use OTC motrin or tylenol as needed for mild-moderate pain, or prescribed percocet as needed for moderate-severe pain. May use stool softener while taking prescribed pain medication to prevent constipation. Do not drive while taking prescribed pain medication. Drink plenty of fluids, avoid overexertion, and get plenty of rest. Please call the office of Dr De Jesus to make an apt. for follow up in 10-14days.  Secondary Diagnosis:	Hypertension  Instructions for follow-up, activity and diet:	Follow up with PCP for further blood pressure management.  Secondary Diagnosis:	BPH (benign prostatic hyperplasia)  Instructions for follow-up, activity and diet:	Follow up with PCP, or Urologist in 1-2 weeks.

## 2017-08-04 NOTE — DISCHARGE NOTE ADULT - CARE PROVIDER_API CALL
Wili De Jesus (MD), Surgery  [address] [facility]  [address] [place] [id number]  Phone: [phone]  Fax: [phone]

## 2017-08-04 NOTE — DISCHARGE NOTE ADULT - PLAN OF CARE
S/p Laparoscopic Appendectomy, recover from surgery May use OTC motrin or tylenol as needed for mild-moderate pain, or prescribed percocet as needed for moderate-severe pain. May use stool softener while taking prescribed pain medication to prevent constipation. Do not drive while taking prescribed pain medication. Drink plenty of fluids, avoid overexertion, and get plenty of rest. Please call the office of Dr De Jesus to make an apt. for follow up in 10-14days. Follow up with PCP for further blood pressure management. Follow up with PCP, or Urologist in 1-2 weeks.

## 2017-08-04 NOTE — PROGRESS NOTE ADULT - SUBJECTIVE AND OBJECTIVE BOX
INTERVAL HPI/OVERNIGHT EVENTS:  Pt. seen and examined at bedside. Patient's son at bedside. Patient was in UR with suprapubic pain at around 10pm last night postoperatively, where a 14F coude was placed. No other acute overnight events since this event. Patient rested well overnight with no complaints of abdominal pain or suprapubic pain. Tolerating alonzo. Tolerating CLD, denies N/V. No BM or Flatus yet. Has not ambulated yet.   Denies CP, sob, dizziness, fever/chills.    Vital Signs Last 24 Hrs  T(C): 37.3 (04 Aug 2017 05:15), Max: 37.7 (04 Aug 2017 02:00)  T(F): 99.1 (04 Aug 2017 05:15), Max: 99.9 (04 Aug 2017 02:00)  HR: 77 (04 Aug 2017 05:15) (65 - 77)  BP: 151/84 (04 Aug 2017 05:15) (151/84 - 206/97)  BP(mean): --  RR: 17 (04 Aug 2017 05:15) (13 - 20)  SpO2: 93% (04 Aug 2017 05:15) (92% - 100%)    MEDICATIONS  (STANDING):  heparin  Injectable 5000 Unit(s) SubCutaneous every 12 hours  tamsulosin 0.4 milliGRAM(s) Oral at bedtime    MEDICATIONS  (PRN):  acetaminophen   Tablet 650 milliGRAM(s) Oral every 6 hours PRN For Temp greater than 38 C (100.4 F)  oxyCODONE    5 mG/acetaminophen 325 mG 1 Tablet(s) Oral every 4 hours PRN Mild Pain (1 - 3)  oxyCODONE    5 mG/acetaminophen 325 mG 2 Tablet(s) Oral every 4 hours PRN Moderate Pain (4 - 6)  ondansetron Injectable 4 milliGRAM(s) IV Push every 6 hours PRN Nausea  morphine  - Injectable 2 milliGRAM(s) IV Push every 6 hours PRN Severe Pain (7 - 10)      PHYSICAL EXAM:    GENERAL: NAD  HEAD:  Atraumatic, Normocephalic  EYES: EOMI, PERRLA, conjunctiva and sclera clear  CHEST/LUNG: Clear to ausculation, bilaterally   HEART: S1S2, RRR  ABDOMEN: mildly distended, Steri strips in place over port sites- C/D/I +BS, soft, non tender, no guarding  : Indwelling alonzo cath in place draining clear yellow urine (1450cc over 8hrs)  EXTREMITIES:  calf soft, non tender     I&O's Detail    02 Aug 2017 07:01  -  03 Aug 2017 07:00  --------------------------------------------------------  IN:    dextrose 5% + sodium chloride 0.45%.: 1500 mL  Total IN: 1500 mL    OUT:    Voided: 200 mL  Total OUT: 200 mL    Total NET: 1300 mL      03 Aug 2017 07:01  -  04 Aug 2017 06:44  --------------------------------------------------------  IN:    lactated ringers.: 150 mL    Oral Fluid: 240 mL    Solution: 150 mL  Total IN: 540 mL    OUT:    Indwelling Catheter - Urethral: 1450 mL    Voided: 2880 mL  Total OUT: 4330 mL    Total NET: -3790 mL      LABS:                        14.9   11.2  )-----------( 128      ( 03 Aug 2017 06:18 )             44.7     08-03    140  |  105  |  10  ----------------------------<  130<H>  3.7   |  25  |  1.23    Ca    8.3<L>      03 Aug 2017 06:18  Phos  2.9     08-  Mg     2.1     08-    TPro  7.7  /  Alb  4.1  /  TBili  1.5<H>  /  DBili  x   /  AST  22  /  ALT  22  /  AlkPhos  82  08-02    PT/INR - ( 02 Aug 2017 19:46 )   PT: 12.0 sec;   INR: 1.10 ratio         PTT - ( 02 Aug 2017 19:46 )  PTT:30.8 sec  Urinalysis Basic - ( 02 Aug 2017 19:46 )    Color: Yellow / Appearance: Clear / S.010 / pH: x  Gluc: x / Ketone: Negative  / Bili: Negative / Urobili: 1 mg/dL   Blood: x / Protein: Negative mg/dL / Nitrite: Negative   Leuk Esterase: Negative / RBC: x / WBC x   Sq Epi: x / Non Sq Epi: x / Bacteria: x    Impression: 66y Male with PMH HTN, BPH admitted with Acute Appendicitis s/p Lap Appy POD#1. In UR post-operatively, 14F coude was placed. Clinically stable.    Plan:  -Advance diet as tolerated  -D/C IVF  -D/C Alonzo this AM for TOV, continue flomax  -pain management PRN  -DVT prophylaxis: Heparin and IPCs  -OOB, Ambulating, encourage incentive spirometer  -continue local wound care  -Continue medical management/supportive care  -f/u AM labs  -will discus pt. with surgical attending

## 2017-08-05 DIAGNOSIS — N20.0 CALCULUS OF KIDNEY: ICD-10-CM

## 2017-08-05 DIAGNOSIS — N40.1 BENIGN PROSTATIC HYPERPLASIA WITH LOWER URINARY TRACT SYMPTOMS: ICD-10-CM

## 2017-08-05 DIAGNOSIS — R33.9 RETENTION OF URINE, UNSPECIFIED: ICD-10-CM

## 2017-08-05 DIAGNOSIS — I10 ESSENTIAL (PRIMARY) HYPERTENSION: ICD-10-CM

## 2017-08-05 LAB
ANION GAP SERPL CALC-SCNC: 10 MMOL/L — SIGNIFICANT CHANGE UP (ref 5–17)
BUN SERPL-MCNC: 8 MG/DL — SIGNIFICANT CHANGE UP (ref 7–23)
CALCIUM SERPL-MCNC: 8.5 MG/DL — SIGNIFICANT CHANGE UP (ref 8.5–10.1)
CHLORIDE SERPL-SCNC: 105 MMOL/L — SIGNIFICANT CHANGE UP (ref 96–108)
CO2 SERPL-SCNC: 25 MMOL/L — SIGNIFICANT CHANGE UP (ref 22–31)
CREAT SERPL-MCNC: 1.14 MG/DL — SIGNIFICANT CHANGE UP (ref 0.5–1.3)
GLUCOSE SERPL-MCNC: 112 MG/DL — HIGH (ref 70–99)
HCT VFR BLD CALC: 45.3 % — SIGNIFICANT CHANGE UP (ref 39–50)
HGB BLD-MCNC: 14.8 G/DL — SIGNIFICANT CHANGE UP (ref 13–17)
MAGNESIUM SERPL-MCNC: 2.2 MG/DL — SIGNIFICANT CHANGE UP (ref 1.6–2.6)
MCHC RBC-ENTMCNC: 29.9 PG — SIGNIFICANT CHANGE UP (ref 27–34)
MCHC RBC-ENTMCNC: 32.8 GM/DL — SIGNIFICANT CHANGE UP (ref 32–36)
MCV RBC AUTO: 91.2 FL — SIGNIFICANT CHANGE UP (ref 80–100)
PHOSPHATE SERPL-MCNC: 2.4 MG/DL — LOW (ref 2.5–4.5)
PLATELET # BLD AUTO: 135 K/UL — LOW (ref 150–400)
POTASSIUM SERPL-MCNC: 3.7 MMOL/L — SIGNIFICANT CHANGE UP (ref 3.5–5.3)
POTASSIUM SERPL-SCNC: 3.7 MMOL/L — SIGNIFICANT CHANGE UP (ref 3.5–5.3)
RBC # BLD: 4.97 M/UL — SIGNIFICANT CHANGE UP (ref 4.2–5.8)
RBC # FLD: 11.6 % — SIGNIFICANT CHANGE UP (ref 11–15)
SODIUM SERPL-SCNC: 140 MMOL/L — SIGNIFICANT CHANGE UP (ref 135–145)
WBC # BLD: 4.7 K/UL — SIGNIFICANT CHANGE UP (ref 3.8–10.5)
WBC # FLD AUTO: 4.7 K/UL — SIGNIFICANT CHANGE UP (ref 3.8–10.5)

## 2017-08-05 PROCEDURE — 99255 IP/OBS CONSLTJ NEW/EST HI 80: CPT

## 2017-08-05 RX ORDER — LISINOPRIL 2.5 MG/1
10 TABLET ORAL DAILY
Qty: 0 | Refills: 0 | Status: DISCONTINUED | OUTPATIENT
Start: 2017-08-05 | End: 2017-08-06

## 2017-08-05 RX ORDER — HYDRALAZINE HCL 50 MG
10 TABLET ORAL ONCE
Qty: 0 | Refills: 0 | Status: COMPLETED | OUTPATIENT
Start: 2017-08-05 | End: 2017-08-05

## 2017-08-05 RX ORDER — LISINOPRIL 2.5 MG/1
10 TABLET ORAL ONCE
Qty: 0 | Refills: 0 | Status: DISCONTINUED | OUTPATIENT
Start: 2017-08-05 | End: 2017-08-05

## 2017-08-05 RX ORDER — HYDRALAZINE HCL 50 MG
10 TABLET ORAL EVERY 6 HOURS
Qty: 0 | Refills: 0 | Status: DISCONTINUED | OUTPATIENT
Start: 2017-08-05 | End: 2017-08-07

## 2017-08-05 RX ORDER — HYDRALAZINE HCL 50 MG
10 TABLET ORAL
Qty: 0 | Refills: 0 | Status: DISCONTINUED | OUTPATIENT
Start: 2017-08-05 | End: 2017-08-06

## 2017-08-05 RX ADMIN — Medication 10 MILLIGRAM(S): at 02:09

## 2017-08-05 RX ADMIN — Medication 10 MILLIGRAM(S): at 18:30

## 2017-08-05 RX ADMIN — HEPARIN SODIUM 5000 UNIT(S): 5000 INJECTION INTRAVENOUS; SUBCUTANEOUS at 18:30

## 2017-08-05 RX ADMIN — HEPARIN SODIUM 5000 UNIT(S): 5000 INJECTION INTRAVENOUS; SUBCUTANEOUS at 06:28

## 2017-08-05 RX ADMIN — LISINOPRIL 10 MILLIGRAM(S): 2.5 TABLET ORAL at 03:54

## 2017-08-05 RX ADMIN — TAMSULOSIN HYDROCHLORIDE 0.4 MILLIGRAM(S): 0.4 CAPSULE ORAL at 21:35

## 2017-08-05 RX ADMIN — ONDANSETRON 4 MILLIGRAM(S): 8 TABLET, FILM COATED ORAL at 03:22

## 2017-08-05 RX ADMIN — Medication 25 MILLIGRAM(S): at 18:30

## 2017-08-05 RX ADMIN — Medication 25 MILLIGRAM(S): at 06:28

## 2017-08-05 NOTE — PROGRESS NOTE ADULT - SUBJECTIVE AND OBJECTIVE BOX
INTERVAL HPI/OVERNIGHT EVENTS:  Called by RN for patient c/o nausea and elevated BP. Patient's BP was 182/101 at 1:30am and was given a push of hydralazine. Repeat BP was 157/81 following hydralazine, however patient c/o nausea 30 min later and found with an elevated BP of 197/96. Zofran was given by RN.     Pt. seen and examined at bedside. C/o nausea that has improved after dose of zofran, denies vomiting. Abdominal pain well controlled with pain medication. Has been tolerating reg. diet. No BM. Ambulated during day yesterday. Tolerating alonzo, denies suprapubic pain.  Denies CP, dizziness, palpitations, HA, blurred vision or fever/chills.     Vital Signs Last 24 Hrs  T(C): 37 (04 Aug 2017 23:33), Max: 37.3 (04 Aug 2017 05:15)  T(F): 98.6 (04 Aug 2017 23:33), Max: 99.1 (04 Aug 2017 05:15)  HR: 72 (05 Aug 2017 02:30) (65 - 77)  BP: 157/81 (05 Aug 2017 02:30) (151/84 - 182/102)  BP(mean): --  RR: 16 (05 Aug 2017 02:30) (16 - 18)  SpO2: 94% (05 Aug 2017 02:30) (93% - 96%)    MEDICATIONS  (STANDING):  heparin  Injectable 5000 Unit(s) SubCutaneous every 12 hours  tamsulosin 0.4 milliGRAM(s) Oral at bedtime  metoprolol 25 milliGRAM(s) Oral two times a day  lisinopril 10 milliGRAM(s) Oral daily    MEDICATIONS  (PRN):  acetaminophen   Tablet 650 milliGRAM(s) Oral every 6 hours PRN For Temp greater than 38 C (100.4 F)  oxyCODONE    5 mG/acetaminophen 325 mG 1 Tablet(s) Oral every 4 hours PRN Mild Pain (1 - 3)  oxyCODONE    5 mG/acetaminophen 325 mG 2 Tablet(s) Oral every 4 hours PRN Moderate Pain (4 - 6)  ondansetron Injectable 4 milliGRAM(s) IV Push every 6 hours PRN Nausea  morphine  - Injectable 2 milliGRAM(s) IV Push every 6 hours PRN Severe Pain (7 - 10)      PHYSICAL EXAM:    GENERAL: NAD  HEAD:  Atraumatic, Normocephalic  EYES: EOMI, PERRLA, conjunctiva and sclera clear  CHEST/LUNG: Clear to ausculation, bilaterally   HEART: S1S2, RRR  ABDOMEN: Steri strips over port sites C/D/I; nondistended, +BS, soft, appropriate postop incisional tenderness, no guarding  : Alonzo in place draining clear yellow urine.  EXTREMITIES:  calf soft, non tender. No peripheral edema.    I&O's Detail    03 Aug 2017 07:01  -  04 Aug 2017 07:00  --------------------------------------------------------  IN:    lactated ringers.: 150 mL    Oral Fluid: 240 mL    Solution: 150 mL  Total IN: 540 mL    OUT:    Indwelling Catheter - Urethral: 1450 mL    Voided: 2880 mL  Total OUT: 4330 mL    Total NET: -3790 mL      04 Aug 2017 07:01  -  05 Aug 2017 03:37  --------------------------------------------------------  IN:  Total IN: 0 mL    OUT:    Indwelling Catheter - Urethral: 600 mL  Total OUT: 600 mL    Total NET: -600 mL      LABS:                        15.2   6.3   )-----------( 117      ( 04 Aug 2017 06:41 )             47.6     08-04    142  |  108  |  6<L>  ----------------------------<  96  3.6   |  24  |  1.23    Ca    8.5      04 Aug 2017 06:41  Phos  3.0     08-04  Mg     2.0     08-04      Impression: 66y Male with pmhx HTN, BPH a/w acute appendicitis s/p Lap Appy POD#2 complicated by UR, with alonzo in place. Also with uncontrolled HTN.    Plan:  -Patient in UR, with hx of BPH-- 14F coude catheter placed-->Alonzo to be removed at 7am for TOV today.  -Lisinopril 10mg PO daily started now, continue metoprolol 25mg PO BID  -Reg diet as tolerated  -pain management PRN, antiemetic PRN  -DVT prophylaxis  -OOB, Ambulating, encourage incentive spirometer  -continue local wound care  -Continue medical management/supportive care  -f/u AM labs  -Will discuss with surgical attending  -Case was discussed with House Physician- Dr. Key in regards to patient's BP management. Recommended to start the patient on lisinopril 10mg daily starting now, as well as continuing with metoprolol 25mg BID. Patient's BP has been on the higher side, with systolic BPs >150 since admission. Target BP goal between 140-150 systolic. INTERVAL HPI/OVERNIGHT EVENTS:  Called by RN for patient c/o nausea and elevated BP. Patient's BP was 182/101 at 1:30am and was given a push of hydralazine. Repeat BP was 157/81 following hydralazine, however patient c/o nausea 30 min later and found with an elevated BP of 197/96. Zofran was given by RN.     Pt. seen and examined at bedside. C/o nausea that has improved after dose of zofran, denies vomiting. Abdominal pain well controlled with pain medication. Has been tolerating reg. diet. No BM. Ambulated during day yesterday. Tolerating alonzo, denies suprapubic pain.  Denies CP, dizziness, palpitations, HA, blurred vision or fever/chills.     Vital Signs Last 24 Hrs  T(C): 37 (04 Aug 2017 23:33), Max: 37.3 (04 Aug 2017 05:15)  T(F): 98.6 (04 Aug 2017 23:33), Max: 99.1 (04 Aug 2017 05:15)  HR: 72 (05 Aug 2017 02:30) (65 - 77)  BP: 157/81 (05 Aug 2017 02:30) (151/84 - 182/102)  BP(mean): --  RR: 16 (05 Aug 2017 02:30) (16 - 18)  SpO2: 94% (05 Aug 2017 02:30) (93% - 96%)    MEDICATIONS  (STANDING):  heparin  Injectable 5000 Unit(s) SubCutaneous every 12 hours  tamsulosin 0.4 milliGRAM(s) Oral at bedtime  metoprolol 25 milliGRAM(s) Oral two times a day  lisinopril 10 milliGRAM(s) Oral daily    MEDICATIONS  (PRN):  acetaminophen   Tablet 650 milliGRAM(s) Oral every 6 hours PRN For Temp greater than 38 C (100.4 F)  oxyCODONE    5 mG/acetaminophen 325 mG 1 Tablet(s) Oral every 4 hours PRN Mild Pain (1 - 3)  oxyCODONE    5 mG/acetaminophen 325 mG 2 Tablet(s) Oral every 4 hours PRN Moderate Pain (4 - 6)  ondansetron Injectable 4 milliGRAM(s) IV Push every 6 hours PRN Nausea  morphine  - Injectable 2 milliGRAM(s) IV Push every 6 hours PRN Severe Pain (7 - 10)      PHYSICAL EXAM:    GENERAL: NAD  HEAD:  Atraumatic, Normocephalic  EYES: EOMI, PERRLA, conjunctiva and sclera clear  CHEST/LUNG: Clear to ausculation, bilaterally   HEART: S1S2, RRR  ABDOMEN: Steri strips over port sites C/D/I; nondistended, +BS, soft, appropriate postop incisional tenderness, no guarding  : Alonzo in place draining clear yellow urine.  EXTREMITIES:  calf soft, non tender. No peripheral edema.    I&O's Detail    03 Aug 2017 07:01  -  04 Aug 2017 07:00  --------------------------------------------------------  IN:    lactated ringers.: 150 mL    Oral Fluid: 240 mL    Solution: 150 mL  Total IN: 540 mL    OUT:    Indwelling Catheter - Urethral: 1450 mL    Voided: 2880 mL  Total OUT: 4330 mL    Total NET: -3790 mL      04 Aug 2017 07:01  -  05 Aug 2017 03:37  --------------------------------------------------------  IN:  Total IN: 0 mL    OUT:    Indwelling Catheter - Urethral: 600 mL  Total OUT: 600 mL    Total NET: -600 mL      LABS:                        15.2   6.3   )-----------( 117      ( 04 Aug 2017 06:41 )             47.6     08-04    142  |  108  |  6<L>  ----------------------------<  96  3.6   |  24  |  1.23    Ca    8.5      04 Aug 2017 06:41  Phos  3.0     08-04  Mg     2.0     08-04      Impression: 66y Male with pmhx HTN, BPH a/w acute appendicitis s/p Lap Appy POD#2 complicated by UR, with alonzo in place. Also with uncontrolled HTN.    Plan:  -Patient in UR, with hx of BPH-- 14F coude catheter placed-->Alonzo to be removed at 7am for TOV today.  -Lisinopril 10mg PO daily started now, continue metoprolol 25mg PO BID  -Reg diet as tolerated  -pain management PRN, antiemetic PRN  -DVT prophylaxis  -OOB, Ambulating, encourage incentive spirometer  -continue local wound care  -Continue medical management/supportive care  -f/u AM labs  -D/C planning  -Will discuss with surgical attending  -Case was discussed with House Physician- Dr. Key in regards to patient's BP management. Recommended to start the patient on lisinopril 10mg daily starting now, as well as continuing with metoprolol 25mg BID. Patient's BP has been on the higher side, with systolic BPs >150 since admission. Target BP goal between 140-150 systolic.

## 2017-08-05 NOTE — CONSULT NOTE ADULT - SUBJECTIVE AND OBJECTIVE BOX
CC: abd pain    HPI: Pt is 66M, w/HTN, nephrolithiasis, BPH, adm 8/2 w/abd pain, CTAP w/acute appendicitis; s/p laparoscopic appendectomy 8/3; hospital course c/b urinary retention w/>480mL on bladder scan, s/p Hernandez catheter placement 8/3, also uncontrolled HTN s/p IV hydralazine, then multidrug oral antiHTNve regimen, including lisinopril, metoprolol, with persistently elevated BP measures; Medicine team consulted for assistance w/BP mgmt.    Currently,             ASSESSMENT AND PLAN:  66M, HTN, nephrolithiasis, BPH, adm 8/2 w/abd pain, CTAP w/acute appendicitis; s/p laparoscopic appendectomy 8/3; hospital course c/b urinary retention/Hernandez placement, and uncontrolled HTN s/p IV hydralazine and oral lisinopril, metoprolol    CV: HTN - adjust antiHTNve regimen to continued metoprolol and lisinopril, and start hydralazine po; also continue hydralazine IV on prn basis for SBP >170; ensure pain controlled, as may contribute significantly to elevated BP measures    : urinary retention in setting of known BPH hx, s/p Hernandez placement/removal - undergoing trial of void - Urology consulted    OTHER:  -dvt prophylaxis  -mobilize OOB CC: abd pain    HPI: Pt is 66M, w/HTN, nephrolithiasis, BPH, adm 8/2 w/abd pain, CTAP w/acute appendicitis; s/p laparoscopic appendectomy 8/3; hospital course c/b urinary retention w/>480mL on bladder scan, s/p Hernandez catheter placement 8/3, also uncontrolled HTN s/p IV hydralazine, then multidrug oral antiHTNve regimen, including lisinopril, metoprolol, with persistently elevated BP measures; Medicine team consulted for assistance w/BP mgmt.    Currently, multiple family members at bedside; pt reports mild abd pain intermittently; no nausea/vomiting; tolerating oral diet w/o difficulty.    PAST MEDICAL & SURGICAL HISTORY:  Hypertension  Kidney stones  No significant past surgical history      REVIEW OF SYSTEMS:    CONSTITUTIONAL: No fever, weight loss, or fatigue  EYES: No eye pain, visual disturbances, or discharge  ENMT:  No difficulty hearing, tinnitus, vertigo; No sinus or throat pain  NECK: No pain or stiffness  RESPIRATORY: No cough, wheezing, chills or hemoptysis; No shortness of breath  CARDIOVASCULAR: No chest pain, palpitations, dizziness, or leg swelling  GASTROINTESTINAL: +abdominal pain. No nausea, vomiting, or hematemesis; No diarrhea or constipation. No melena or hematochezia.  GENITOURINARY: No dysuria, frequency, hematuria, or incontinence  NEUROLOGICAL: No headaches, memory loss, loss of strength, numbness, or tremors  SKIN: No itching, burning, rashes, or lesions   LYMPH NODES: No enlarged glands  ENDOCRINE: No heat or cold intolerance; No hair loss  MUSCULOSKELETAL: No joint pain or swelling; No muscle, back, or extremity pain  PSYCHIATRIC: No depression, anxiety, mood swings, or difficulty sleeping  HEME/LYMPH: No easy bruising, or bleeding gums  ALLERGY AND IMMUNOLOGIC: No hives or eczema        Allergies/Intolerance: No Known Allergies      MEDICATIONS  (STANDING):  heparin  Injectable 5000 Unit(s) SubCutaneous every 12 hours  tamsulosin 0.4 milliGRAM(s) Oral at bedtime  metoprolol 25 milliGRAM(s) Oral two times a day  lisinopril 10 milliGRAM(s) Oral daily      MEDICATIONS  (PRN):  acetaminophen   Tablet 650 milliGRAM(s) Oral every 6 hours PRN For Temp greater than 38 C (100.4 F)  oxyCODONE    5 mG/acetaminophen 325 mG 1 Tablet(s) Oral every 4 hours PRN Mild Pain (1 - 3)  oxyCODONE    5 mG/acetaminophen 325 mG 2 Tablet(s) Oral every 4 hours PRN Moderate Pain (4 - 6)  ondansetron Injectable 4 milliGRAM(s) IV Push every 6 hours PRN Nausea  morphine  - Injectable 2 milliGRAM(s) IV Push every 6 hours PRN Severe Pain (7 - 10)        ROS: as above; all other systems reviewed and wnl      PHYSICAL EXAMINATION:  Vital Signs Last 24 Hrs  T(C): 37.3 (05 Aug 2017 17:49), Max: 37.5 (05 Aug 2017 05:17)  T(F): 99.1 (05 Aug 2017 17:49), Max: 99.5 (05 Aug 2017 05:17)  HR: 81 (05 Aug 2017 17:49) (65 - 84)  BP: 184/94 (05 Aug 2017 17:49) (157/81 - 197/96)  RR: 17 (05 Aug 2017 17:49) (16 - 18)  SpO2: 94% (05 Aug 2017 17:49) (94% - 96%)      GENERAL: middle-aged male; NAD, well-groomed, well-developed  HEAD:  atraumatic, normocephalic  EYES: sclera anicteric  ENMT: mucous membranes dry  NECK: supple, No JVD  CHEST/LUNG: respirations unlabored; air entry symmetric; clear to auscultation bilaterally; no rales, rhonchi, or wheezing b/l  HEART: normal S1, S2  ABDOMEN: BS+, soft, mildly distended; laparoscopic surgical closures in place; mild ttp at surgical wound sites (appropriate)  EXTREMITIES:  pulses palpable; no clubbing, cyanosis, or edema b/l LEs  NEURO: awake, alert, interactive; moves all extremities  SKIN: no rashes or lesions      LABS:                        14.8   4.7   )-----------( 135      ( 05 Aug 2017 05:57 )             45.3     08-05    140  |  105  |  8   ----------------------------<  112<H>  3.7   |  25  |  1.14    Ca    8.5      05 Aug 2017 05:57  Phos  2.4     08-05  Mg     2.2     08-05      ASSESSMENT AND PLAN:  66M, HTN, nephrolithiasis, BPH, adm 8/2 w/abd pain, CTAP w/acute appendicitis; s/p laparoscopic appendectomy 8/3; hospital course c/b urinary retention/Hernandez placement, and uncontrolled HTN s/p IV hydralazine and oral lisinopril, metoprolol; being optimized on BP regimen    CV: HTN - will adjust antiHTNve regimen to continue metoprolol and lisinopril, and start hydralazine po; also continue hydralazine IV on prn basis for SBP >170; ensure pain controlled, as may contribute significantly to elevated BP measures    /RENAL:   *urinary retention in setting of known BPH hx, s/p Hernandez placement/removal - undergoing trial of void - Urology consulted    *nephrolithiasis - stable    OTHER:  -dvt prophylaxis  -mobilize OOB  -all diagnoses/mgmt plans discussed in detail w/pt, family at bedside; all questions answered

## 2017-08-06 LAB
ANION GAP SERPL CALC-SCNC: 8 MMOL/L — SIGNIFICANT CHANGE UP (ref 5–17)
BUN SERPL-MCNC: 9 MG/DL — SIGNIFICANT CHANGE UP (ref 7–23)
CALCIUM SERPL-MCNC: 8.7 MG/DL — SIGNIFICANT CHANGE UP (ref 8.5–10.1)
CHLORIDE SERPL-SCNC: 105 MMOL/L — SIGNIFICANT CHANGE UP (ref 96–108)
CO2 SERPL-SCNC: 28 MMOL/L — SIGNIFICANT CHANGE UP (ref 22–31)
CREAT SERPL-MCNC: 1.15 MG/DL — SIGNIFICANT CHANGE UP (ref 0.5–1.3)
GLUCOSE SERPL-MCNC: 97 MG/DL — SIGNIFICANT CHANGE UP (ref 70–99)
HCT VFR BLD CALC: 46.8 % — SIGNIFICANT CHANGE UP (ref 39–50)
HGB BLD-MCNC: 15.2 G/DL — SIGNIFICANT CHANGE UP (ref 13–17)
MCHC RBC-ENTMCNC: 29.9 PG — SIGNIFICANT CHANGE UP (ref 27–34)
MCHC RBC-ENTMCNC: 32.4 GM/DL — SIGNIFICANT CHANGE UP (ref 32–36)
MCV RBC AUTO: 92.1 FL — SIGNIFICANT CHANGE UP (ref 80–100)
PLATELET # BLD AUTO: 156 K/UL — SIGNIFICANT CHANGE UP (ref 150–400)
POTASSIUM SERPL-MCNC: 4.2 MMOL/L — SIGNIFICANT CHANGE UP (ref 3.5–5.3)
POTASSIUM SERPL-SCNC: 4.2 MMOL/L — SIGNIFICANT CHANGE UP (ref 3.5–5.3)
RBC # BLD: 5.08 M/UL — SIGNIFICANT CHANGE UP (ref 4.2–5.8)
RBC # FLD: 11.8 % — SIGNIFICANT CHANGE UP (ref 11–15)
SODIUM SERPL-SCNC: 141 MMOL/L — SIGNIFICANT CHANGE UP (ref 135–145)
WBC # BLD: 3.8 K/UL — SIGNIFICANT CHANGE UP (ref 3.8–10.5)
WBC # FLD AUTO: 3.8 K/UL — SIGNIFICANT CHANGE UP (ref 3.8–10.5)

## 2017-08-06 RX ORDER — HYDRALAZINE HCL 50 MG
25 TABLET ORAL
Qty: 0 | Refills: 0 | Status: DISCONTINUED | OUTPATIENT
Start: 2017-08-06 | End: 2017-08-07

## 2017-08-06 RX ORDER — LISINOPRIL 2.5 MG/1
10 TABLET ORAL ONCE
Qty: 0 | Refills: 0 | Status: COMPLETED | OUTPATIENT
Start: 2017-08-06 | End: 2017-08-06

## 2017-08-06 RX ORDER — HYDROCHLOROTHIAZIDE 25 MG
25 TABLET ORAL DAILY
Qty: 0 | Refills: 0 | Status: DISCONTINUED | OUTPATIENT
Start: 2017-08-06 | End: 2017-08-07

## 2017-08-06 RX ORDER — LISINOPRIL 2.5 MG/1
20 TABLET ORAL DAILY
Qty: 0 | Refills: 0 | Status: DISCONTINUED | OUTPATIENT
Start: 2017-08-07 | End: 2017-08-07

## 2017-08-06 RX ADMIN — HEPARIN SODIUM 5000 UNIT(S): 5000 INJECTION INTRAVENOUS; SUBCUTANEOUS at 17:23

## 2017-08-06 RX ADMIN — LISINOPRIL 10 MILLIGRAM(S): 2.5 TABLET ORAL at 05:44

## 2017-08-06 RX ADMIN — Medication 25 MILLIGRAM(S): at 17:23

## 2017-08-06 RX ADMIN — Medication 25 MILLIGRAM(S): at 05:43

## 2017-08-06 RX ADMIN — Medication 25 MILLIGRAM(S): at 13:43

## 2017-08-06 RX ADMIN — HEPARIN SODIUM 5000 UNIT(S): 5000 INJECTION INTRAVENOUS; SUBCUTANEOUS at 05:44

## 2017-08-06 RX ADMIN — Medication 10 MILLIGRAM(S): at 12:49

## 2017-08-06 RX ADMIN — LISINOPRIL 10 MILLIGRAM(S): 2.5 TABLET ORAL at 13:43

## 2017-08-06 RX ADMIN — Medication 10 MILLIGRAM(S): at 00:02

## 2017-08-06 RX ADMIN — Medication 10 MILLIGRAM(S): at 12:18

## 2017-08-06 RX ADMIN — TAMSULOSIN HYDROCHLORIDE 0.4 MILLIGRAM(S): 0.4 CAPSULE ORAL at 21:31

## 2017-08-06 RX ADMIN — Medication 10 MILLIGRAM(S): at 05:43

## 2017-08-06 NOTE — PROGRESS NOTE ADULT - SUBJECTIVE AND OBJECTIVE BOX
CC/F/U for: uncontrolled HTN, s/p appendectomy    HPI:  65 yo male w/ pmh of nephrolithiasis and HTN, not currently on medication presents to the ED for abdominal pain X 1day. Reports constant pain, gradual onset, worsening throughout day, mostly RLQ, 10/10 intensity, exacerbated by movement, not relieved by anything in particular, associated w/ nausea. Denies fever, chills, cough, cp, sob, dysuria/hematuria, constipation/diarrhea, dark or bloody stools, rash, trauma, past surgery or bleeding disorders. Denies med allergies. Last BM was approx 7am today. Last PO food was approx 8am (eggs, bread and coffee).    CT abdomen and pelvis shows acute uncomplicated appendicitis. (02 Aug 2017 23:31)        INTERVAL HPI/OVERNIGHT EVENTS:  Pt seen and examined at bedside; no complaints, no headache, dizziness, sob, chest pain; abd pain minimal; BP still elevated to SBP 180s.     Allergies/Intolerance: No Known Allergies      MEDICATIONS  (STANDING):  heparin  Injectable 5000 Unit(s) SubCutaneous every 12 hours  tamsulosin 0.4 milliGRAM(s) Oral at bedtime  metoprolol 25 milliGRAM(s) Oral two times a day  hydrALAZINE 25 milliGRAM(s) Oral four times a day  lisinopril 10 milliGRAM(s) Oral once  hydrochlorothiazide 25 milliGRAM(s) Oral daily    MEDICATIONS  (PRN):  acetaminophen   Tablet 650 milliGRAM(s) Oral every 6 hours PRN For Temp greater than 38 C (100.4 F)  oxyCODONE    5 mG/acetaminophen 325 mG 1 Tablet(s) Oral every 4 hours PRN Mild Pain (1 - 3)  oxyCODONE    5 mG/acetaminophen 325 mG 2 Tablet(s) Oral every 4 hours PRN Moderate Pain (4 - 6)  ondansetron Injectable 4 milliGRAM(s) IV Push every 6 hours PRN Nausea  morphine  - Injectable 2 milliGRAM(s) IV Push every 6 hours PRN Severe Pain (7 - 10)  hydrALAZINE Injectable 10 milliGRAM(s) IV Push every 6 hours PRN sbp>170        ROS: as above; all other systems reviewed and wnl      PHYSICAL EXAMINATION:  Vital Signs Last 24 Hrs  T(C): 36.8 (06 Aug 2017 12:01), Max: 37.3 (05 Aug 2017 17:49)  T(F): 98.2 (06 Aug 2017 12:01), Max: 99.1 (05 Aug 2017 17:49)  HR: 71 (06 Aug 2017 12:01) (70 - 81)  BP: 185/95 (06 Aug 2017 12:01) (157/81 - 185/95)  RR: 16 (06 Aug 2017 12:01) (16 - 17)  SpO2: 95% (06 Aug 2017 12:01) (94% - 97%)      GENERAL: middle-aged male; NAD, well-groomed, well-developed  HEAD:  atraumatic, normocephalic  EYES: sclera anicteric  ENMT: mucous membranes dry  NECK: supple, No JVD  CHEST/LUNG: respirations unlabored; air entry symmetric; clear to auscultation bilaterally; no rales, rhonchi, or wheezing b/l  HEART: normal S1, S2  ABDOMEN: BS+, soft, mildly distended; laparoscopic surgical closures in place; mild ttp at surgical wound sites (appropriate)  EXTREMITIES:  pulses palpable; no clubbing, cyanosis, or edema b/l LEs  NEURO: awake, alert, interactive; moves all extremities  SKIN: no rashes or lesions    LABS:                        15.2   3.8   )-----------( 156      ( 06 Aug 2017 07:01 )             46.8     08-06    141  |  105  |  9   ----------------------------<  97  4.2   |  28  |  1.15    Ca    8.7      06 Aug 2017 07:01  Phos  2.4     08-05  Mg     2.2     08-05      ASSESSMENT AND PLAN:  66M, HTN, nephrolithiasis, BPH, adm 8/2 w/abd pain, CTAP w/acute appendicitis; s/p laparoscopic appendectomy 8/3; hospital course c/b urinary retention/Hernandez placement, and uncontrolled HTN s/p IV hydralazine and oral lisinopril, metoprolol; being optimized on BP regimen    CV: HTN - increase antiHTNve regimen to higher dose lisinopril to 20mg qd, higher dose hydralazine to 25mg qid, start HCTZ 25mg qd; continue lopresor and hydralazine IV on prn basis for SBP >170; post-surgical pain appears to be minimal    /RENAL:   *urinary retention in setting of known BPH hx, s/p Hernandez placement/removal; s/p trial of void    *nephrolithiasis - stable    OTHER:  -dvt prophylaxis  -continue to mobilize OOB  -all diagnoses/mgmt plans discussed in detail w/pt, family at bedside; all questions answered

## 2017-08-06 NOTE — PROGRESS NOTE ADULT - ATTENDING COMMENTS
Patient seen/examined.  Agree w above note and plan and have discussed plan w house staff. Home    Jordy Witt MD
Pt seen and examined  Agree with note which was reviewed and edited where appropriate.  D/W patient, RN and SPA.

## 2017-08-06 NOTE — PROGRESS NOTE ADULT - SUBJECTIVE AND OBJECTIVE BOX
INTERVAL HPI/OVERNIGHT EVENTS:  Pt. seen and examined at bedside. No acute overnight events. patient doing well. Offers no complaints of abdominal pain, n/v. Voiding without difficulty. Ambulating.  Denies CP, palpitations, dizziness, dysuria.     Vital Signs Last 24 Hrs  T(C): 36.8 (06 Aug 2017 05:45), Max: 37.3 (05 Aug 2017 17:49)  T(F): 98.3 (06 Aug 2017 05:45), Max: 99.1 (05 Aug 2017 17:49)  HR: 73 (06 Aug 2017 05:45) (70 - 81)  BP: 160/95 (06 Aug 2017 05:45) (157/81 - 184/94)  BP(mean): --  RR: 17 (06 Aug 2017 05:45) (16 - 17)  SpO2: 97% (06 Aug 2017 05:45) (94% - 97%)    MEDICATIONS  (STANDING):  heparin  Injectable 5000 Unit(s) SubCutaneous every 12 hours  tamsulosin 0.4 milliGRAM(s) Oral at bedtime  metoprolol 25 milliGRAM(s) Oral two times a day  lisinopril 10 milliGRAM(s) Oral daily  hydrALAZINE 10 milliGRAM(s) Oral four times a day    MEDICATIONS  (PRN):  acetaminophen   Tablet 650 milliGRAM(s) Oral every 6 hours PRN For Temp greater than 38 C (100.4 F)  oxyCODONE    5 mG/acetaminophen 325 mG 1 Tablet(s) Oral every 4 hours PRN Mild Pain (1 - 3)  oxyCODONE    5 mG/acetaminophen 325 mG 2 Tablet(s) Oral every 4 hours PRN Moderate Pain (4 - 6)  ondansetron Injectable 4 milliGRAM(s) IV Push every 6 hours PRN Nausea  morphine  - Injectable 2 milliGRAM(s) IV Push every 6 hours PRN Severe Pain (7 - 10)  hydrALAZINE Injectable 10 milliGRAM(s) IV Push every 6 hours PRN sbp>170      PHYSICAL EXAM:    GENERAL: NAD  HEAD:  Atraumatic, Normocephalic  EYES: EOMI, PERRLA, conjunctiva and sclera clear  CHEST/LUNG: Clear to ausculation, bilaterally   HEART: S1S2  ABDOMEN: Steri strips over port sites C/D/I; nondistended, +BS, soft, appropriate postop incisional tenderness, no guarding  EXTREMITIES:  calf soft, non tender     I&O's Detail    04 Aug 2017 07:01  -  05 Aug 2017 07:00  --------------------------------------------------------  IN:  Total IN: 0 mL    OUT:    Indwelling Catheter - Urethral: 1450 mL  Total OUT: 1450 mL    Total NET: -1450 mL      05 Aug 2017 07:01  -  06 Aug 2017 06:38  --------------------------------------------------------  IN:  Total IN: 0 mL    OUT:    Voided: 600 mL  Total OUT: 600 mL    Total NET: -600 mL      LABS:                        14.8   4.7   )-----------( 135      ( 05 Aug 2017 05:57 )             45.3     08-05    140  |  105  |  8   ----------------------------<  112<H>  3.7   |  25  |  1.14    Ca    8.5      05 Aug 2017 05:57  Phos  2.4     08-05  Mg     2.2     08-05      Impression: 66y Male with pmhx HTN, BPH a/w acute appendicitis s/p Lap Appy POD#3 complicated by UR,  still in hospital due to uncontrolled HTN. Stable, clinically doing well however BP's still elevated.    Plan:  -continue reg diet  -Continue metoprolol, lisinopril and hydralazine per medicine  -DVT prophylaxis: Heparin and IPCs  -OOB, Ambulating, encourage incentive spirometer  -Continue medical management/supportive care  -f/u AM labs  -D/C planning when medically cleared  -will discus pt. with surgical attending

## 2017-08-07 VITALS
SYSTOLIC BLOOD PRESSURE: 144 MMHG | DIASTOLIC BLOOD PRESSURE: 92 MMHG | TEMPERATURE: 99 F | HEART RATE: 82 BPM | OXYGEN SATURATION: 98 % | RESPIRATION RATE: 18 BRPM

## 2017-08-07 LAB
ANION GAP SERPL CALC-SCNC: 7 MMOL/L — SIGNIFICANT CHANGE UP (ref 5–17)
BUN SERPL-MCNC: 12 MG/DL — SIGNIFICANT CHANGE UP (ref 7–23)
CALCIUM SERPL-MCNC: 9 MG/DL — SIGNIFICANT CHANGE UP (ref 8.5–10.1)
CHLORIDE SERPL-SCNC: 103 MMOL/L — SIGNIFICANT CHANGE UP (ref 96–108)
CO2 SERPL-SCNC: 29 MMOL/L — SIGNIFICANT CHANGE UP (ref 22–31)
CREAT SERPL-MCNC: 1.15 MG/DL — SIGNIFICANT CHANGE UP (ref 0.5–1.3)
GLUCOSE SERPL-MCNC: 105 MG/DL — HIGH (ref 70–99)
HCT VFR BLD CALC: 47.1 % — SIGNIFICANT CHANGE UP (ref 39–50)
HGB BLD-MCNC: 15.4 G/DL — SIGNIFICANT CHANGE UP (ref 13–17)
MAGNESIUM SERPL-MCNC: 2.3 MG/DL — SIGNIFICANT CHANGE UP (ref 1.6–2.6)
MCHC RBC-ENTMCNC: 29.7 PG — SIGNIFICANT CHANGE UP (ref 27–34)
MCHC RBC-ENTMCNC: 32.6 GM/DL — SIGNIFICANT CHANGE UP (ref 32–36)
MCV RBC AUTO: 91 FL — SIGNIFICANT CHANGE UP (ref 80–100)
PHOSPHATE SERPL-MCNC: 3.6 MG/DL — SIGNIFICANT CHANGE UP (ref 2.5–4.5)
PLATELET # BLD AUTO: 165 K/UL — SIGNIFICANT CHANGE UP (ref 150–400)
POTASSIUM SERPL-MCNC: 4.3 MMOL/L — SIGNIFICANT CHANGE UP (ref 3.5–5.3)
POTASSIUM SERPL-SCNC: 4.3 MMOL/L — SIGNIFICANT CHANGE UP (ref 3.5–5.3)
RBC # BLD: 5.18 M/UL — SIGNIFICANT CHANGE UP (ref 4.2–5.8)
RBC # FLD: 11.6 % — SIGNIFICANT CHANGE UP (ref 11–15)
SODIUM SERPL-SCNC: 139 MMOL/L — SIGNIFICANT CHANGE UP (ref 135–145)
WBC # BLD: 4 K/UL — SIGNIFICANT CHANGE UP (ref 3.8–10.5)
WBC # FLD AUTO: 4 K/UL — SIGNIFICANT CHANGE UP (ref 3.8–10.5)

## 2017-08-07 RX ORDER — LISINOPRIL 2.5 MG/1
1 TABLET ORAL
Qty: 30 | Refills: 0 | OUTPATIENT
Start: 2017-08-07 | End: 2017-09-06

## 2017-08-07 RX ORDER — DIPHENHYDRAMINE HCL 50 MG
25 CAPSULE ORAL ONCE
Qty: 0 | Refills: 0 | Status: COMPLETED | OUTPATIENT
Start: 2017-08-07 | End: 2017-08-07

## 2017-08-07 RX ORDER — TAMSULOSIN HYDROCHLORIDE 0.4 MG/1
1 CAPSULE ORAL
Qty: 30 | Refills: 0 | OUTPATIENT
Start: 2017-08-07 | End: 2017-09-06

## 2017-08-07 RX ORDER — OXYCODONE HYDROCHLORIDE 5 MG/1
1 TABLET ORAL
Qty: 20 | Refills: 0 | OUTPATIENT
Start: 2017-08-07

## 2017-08-07 RX ORDER — METOPROLOL TARTRATE 50 MG
1 TABLET ORAL
Qty: 60 | Refills: 0 | OUTPATIENT
Start: 2017-08-07 | End: 2017-09-06

## 2017-08-07 RX ADMIN — Medication 25 MILLIGRAM(S): at 01:29

## 2017-08-07 RX ADMIN — Medication 25 MILLIGRAM(S): at 05:52

## 2017-08-07 RX ADMIN — HEPARIN SODIUM 5000 UNIT(S): 5000 INJECTION INTRAVENOUS; SUBCUTANEOUS at 05:52

## 2017-08-07 RX ADMIN — Medication 25 MILLIGRAM(S): at 00:18

## 2017-08-07 RX ADMIN — Medication 25 MILLIGRAM(S): at 12:13

## 2017-08-07 RX ADMIN — LISINOPRIL 20 MILLIGRAM(S): 2.5 TABLET ORAL at 05:52

## 2017-08-07 NOTE — CHART NOTE - NSCHARTNOTEFT_GEN_A_CORE
discuss case with medicine.  had palpitation this am.  ekg done.  no change.  Medicine reviewed findings.  OK to dc on medicine standpoint.  Discuss to family.

## 2017-08-08 PROBLEM — Z00.00 ENCOUNTER FOR PREVENTIVE HEALTH EXAMINATION: Status: ACTIVE | Noted: 2017-08-08

## 2017-08-17 DIAGNOSIS — I10 ESSENTIAL (PRIMARY) HYPERTENSION: ICD-10-CM

## 2017-08-17 DIAGNOSIS — K35.3 ACUTE APPENDICITIS WITH LOCALIZED PERITONITIS: ICD-10-CM

## 2017-08-17 DIAGNOSIS — Z87.442 PERSONAL HISTORY OF URINARY CALCULI: ICD-10-CM

## 2017-08-17 DIAGNOSIS — N40.1 BENIGN PROSTATIC HYPERPLASIA WITH LOWER URINARY TRACT SYMPTOMS: ICD-10-CM

## 2017-08-17 DIAGNOSIS — R33.8 OTHER RETENTION OF URINE: ICD-10-CM

## 2017-08-21 PROBLEM — N20.0 CALCULUS OF KIDNEY: Chronic | Status: ACTIVE | Noted: 2017-08-02

## 2017-08-28 ENCOUNTER — APPOINTMENT (OUTPATIENT)
Dept: SURGERY | Facility: CLINIC | Age: 67
End: 2017-08-28
Payer: SELF-PAY

## 2017-08-28 VITALS
DIASTOLIC BLOOD PRESSURE: 93 MMHG | BODY MASS INDEX: 22.73 KG/M2 | HEIGHT: 68 IN | WEIGHT: 150 LBS | RESPIRATION RATE: 15 BRPM | OXYGEN SATURATION: 98 % | TEMPERATURE: 97.8 F | SYSTOLIC BLOOD PRESSURE: 168 MMHG | HEART RATE: 51 BPM

## 2017-08-28 DIAGNOSIS — K35.80 UNSPECIFIED ACUTE APPENDICITIS: ICD-10-CM

## 2017-08-28 DIAGNOSIS — N40.0 BENIGN PROSTATIC HYPERPLASIA WITHOUT LOWER URINARY TRACT SYMPMS: ICD-10-CM

## 2017-08-28 DIAGNOSIS — Z90.49 ACQUIRED ABSENCE OF OTHER SPECIFIED PARTS OF DIGESTIVE TRACT: ICD-10-CM

## 2017-08-28 DIAGNOSIS — M19.90 UNSPECIFIED OSTEOARTHRITIS, UNSPECIFIED SITE: ICD-10-CM

## 2017-08-28 DIAGNOSIS — Z87.448 PERSONAL HISTORY OF OTHER DISEASES OF URINARY SYSTEM: ICD-10-CM

## 2017-08-28 DIAGNOSIS — Z78.9 OTHER SPECIFIED HEALTH STATUS: ICD-10-CM

## 2017-08-28 DIAGNOSIS — I10 ESSENTIAL (PRIMARY) HYPERTENSION: ICD-10-CM

## 2017-08-28 PROCEDURE — 99024 POSTOP FOLLOW-UP VISIT: CPT

## 2017-08-28 RX ORDER — HYDROCHLOROTHIAZIDE 25 MG/1
25 TABLET ORAL
Refills: 0 | Status: ACTIVE | COMMUNITY

## 2017-08-28 RX ORDER — METOPROLOL TARTRATE 25 MG/1
25 TABLET, FILM COATED ORAL
Refills: 0 | Status: ACTIVE | COMMUNITY

## 2017-08-28 RX ORDER — TAMSULOSIN HYDROCHLORIDE 0.4 MG/1
0.4 CAPSULE ORAL
Refills: 0 | Status: ACTIVE | COMMUNITY

## 2017-08-28 RX ORDER — LISINOPRIL 20 MG/1
20 TABLET ORAL
Refills: 0 | Status: ACTIVE | COMMUNITY

## 2019-04-29 NOTE — BRIEF OPERATIVE NOTE - POST-OP DX
Diabetes Education  Author: Karla Nicole RD  Date: 4/29/2019    Diabetes Care Management Summary  Pt visit today focused on introducing pt to diabetes self management w emphasis on CHO awareness/counting, meal planning/label reading, SMBG, exercise, and meds.  Diabetes Education Record Assessment/Progress: Initial  Current Diabetes Risk Level: Moderate     Last A1c:   Lab Results   Component Value Date    HGBA1C 8.6 (H) 03/01/2019     Last visit with Diabetes Educator: : 04/29/2019  Diabetes Type  Diabetes Type : Type II  Diabetes History  Diabetes Diagnosis: 5-10 years  Current Treatment: Oral Medication, Diet  Reviewed Problem List with Patient: Yes    Health Maintenance was reviewed today with patient. Discussed with patient importance of routine eye exams, foot exams/foot care, blood work (i.e.: A1c, microalbumin, and lipid), dental visits, yearly flu vaccine, and pneumonia vaccine as indicated by PCP. Patient verbalized understanding.     Health Maintenance Topics with due status: Not Due       Topic Last Completion Date    Colonoscopy 08/21/2018    Lipid Panel 10/03/2018    Eye Exam 02/22/2019    Hemoglobin A1c 03/01/2019    Foot Exam 04/12/2019    High Dose Statin 04/15/2019     Health Maintenance Due   Topic Date Due    TETANUS VACCINE  07/31/1963    Zoster Vaccine  07/31/2005    Pneumococcal Vaccine (65+ High/Highest Risk) (2 of 2 - PCV13) 08/14/2015    Influenza Vaccine  08/01/2018       Nutrition  Meal Planning: skipping meals, diet drinks, artificial sweeteners, eats out often  What type of sweetener do you use?: Splenda  What type of beverages do you drink?: diet soda/tea, juice, milk, water, sport drinks  Meal Plan 24 Hour Recall - Breakfast: 8-10am: 2 scrambled eggs, coffee w splenda  Meal Plan 24 Hour Recall - Lunch: diet cranberry juice  Meal Plan 24 Hour Recall - Dinner: 6-8pm: pre-cooked chicken joel w butter, baked sweet potato w butter, sour cream, and abdullahi bits, green peas, veg salad w  "oil/ving dressing, water    Monitoring   Monitoring: Other  Self Monitoring : 1x/day  Blood Glucose Logs: No  Do you use a personal continuous glucose monitor?: No  In the last month, how often have you had a low blood sugar reaction?: once  What are your symptoms of low blood sugar?: shaky  How do you treat low blood sugar?: coke  Can you tell when your blood sugar is too high?: no    Exercise   Exercise Type: none    Current Diabetes Treatment   Current Treatment: Oral Medication, Diet    Social History  Preferred Learning Method: Face to Face, Group Education  Primary Support: Self, Spouse  Educational Level: College Graduate  Occupation: retired   Smoking Status: Ex Smoker  Alcohol Use: Rarely  Barriers to Change: None  Learning Challenges : None   Readiness to Learn : Acceptance  Cultural Influences: No    Diabetes Education Assessment/Progress  Diabetes Disease Process (diabetes disease process and treatment options): Instructed, Comprehends Key Points, Discussion, Written Materials Provided, Individual Session  Nutrition (Incorporating nutritional management into one's lifestyle): Individual Session, Written Materials Provided, Instructed, Discussion, Comprehends Henson Points  -diet recall indicates pt often skips lunch, attempts to eliminated all "white foods"- bread, rice, potato but portions of remaining foods large and does include high carb foods such as starchy veg- peas, rice and sweet potato w large amt of fat added. 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 Pt states he looks at amt of sugar on labels and often disregards amt of total carb in the food item. Variety in meals limited; fresh fruit once/wk,  veg 1-2x/wk and dairy 1-2x/wk  -Discussed Ketogenic diet and suggested pt focus on portion control rather than total elimination of important food groups (milk, fruit, whole grains)  -Discussed carb vs non-carb foods. Discussed appropriate amount of carbs to have at " "meals/snacks. Discussed appropriate serving sizes of individual carb items.   -Reviewed label reading, portion control (hand) and using the plate method of meal planning.   -Instructed pt to aim for 3 evenly-spaced meals with 45-60 gm carb/meal and 0-15 gm at snacks.    Physical Activity (incorporating physical activity into one's lifestyle): Individual Session, Written Materials Provided, Instructed, Discussion, Comprehends Key Points  -retired  but does not exercise regularly  -Discussed benefits of physical activity on BG control and encouraged pt to start an exercise/ walking program for a total of 150 minutes per week while  keeping 3 exercise components in mind: Frequency- 3-5x/wk, Duration- 30 min, Intensity- can say name but "not sing a song"    Medications (states correct name, dose, onset, peak, duration, side effects & timing of meds): Instructed, Comprehends Key Points, Discussion, Written Materials Provided, Individual Session  -pt reminded to take DM meds (metformin, Amaryl) before bkfst and dinner and to include at least 15-25 g carb at the meal  -  Monitoring (monitoring blood glucose/other parameters & using results): Individual Session, Written Materials Provided, Instructed, Discussion, Demonstrates Understanding/Competency (verbalizes/demonstrates)  -pt states he only check BG fasting w values around 131  -Reviewed A1c goal of 7or less % and BGgoals of 80-130pre meal/fasting, <180 2hrpp;   discussed BG readings and how to use data in DM self management; rec'd continue SMBG 1-2x daily, fasting and alternating times; keep log/ copy log sheet provided.    Acute Complications (preventing, detecting, and treating acute complications): Individual Session, Written Materials Provided, Instructed, Discussion, Comprehends Key Points  -pt had a recent episode of hypo and treated w coke-  -Reviewed s/s, causes, and treatment of hyperglycemia and hypoglycemia     Chronic Complications (preventing, " detecting, and treating chronic complications): Individual Session, Written Materials Provided, Instructed, Discussion, Demonstrates Understanding/Competency (verbalizes/demonstrates)  Clinical (diabetes, other pertinent medical history, and relevant comorbidities reviewed during visit): Individual Session, Written Materials Provided, Instructed, Discussion, Comprehends Key Points  Cognitive (knowledge of self-management skills, functional health literacy): Discussion  Psychosocial (emotional response to diabetes): Discussion  Diabetes Distress and Support Systems: Discussion  Behavioral (readiness for change, lifestyle practices, self-care behaviors): Discussion    Goals  Patient has selected/evaluated goals during today's session: Yes, selected  Healthy Eating: Set(distrute carbs into 3 evenly spaced meals/day, 45-60 g carb/meal)  Start Date: 04/29/19  Target Date: 05/29/19  Physical Activity: Set(exercise 3-5x/wk, 30+ min duration)  Start Date: 04/29/19  Target Date: 05/29/19  Monitoring: Set(SMBG 2/day, fasting and alternating 2hrpp)  Start Date: 04/29/19  Target Date: 05/29/19     Diabetes Care Plan/Intervention  Education Plan/Intervention: Individual Follow-Up DSMT(contact information provided; will schedule follow up )    Diabetes Meal Plan  Restrictions: Restricted Carbohydrate  Carbohydrate Per Meal: 45-60g  Carbohydrate Per Snack : 15-20g    Today's Self-Management Care Plan was developed with the patient's input and is based on barriers identified during today's assessment.    The long and short-term goals in the care plan were written with the patient/caregiver's input. The patient has agreed to work toward these goals to improve his overall diabetes control.      The patient received a copy of today's self-management plan and verbalized understanding of the care plan, goals, and all of today's instructions.      The patient was encouraged to communicate with his physician and care team regarding his  condition(s) and treatment.  I provided the patient with my contact information today and encouraged him to contact me via phone or patient portal as needed.     Education Units of Time   Time Spent: 60 min   Acute appendicitis with localized peritonitis  08/03/2017    Active  Charbel Calderon